# Patient Record
Sex: FEMALE | Race: ASIAN | NOT HISPANIC OR LATINO | Employment: OTHER | URBAN - METROPOLITAN AREA
[De-identification: names, ages, dates, MRNs, and addresses within clinical notes are randomized per-mention and may not be internally consistent; named-entity substitution may affect disease eponyms.]

---

## 2017-04-07 ENCOUNTER — OFFICE VISIT (OUTPATIENT)
Dept: MEDICAL GROUP | Facility: PHYSICIAN GROUP | Age: 62
End: 2017-04-07
Payer: COMMERCIAL

## 2017-04-07 ENCOUNTER — HOSPITAL ENCOUNTER (OUTPATIENT)
Facility: MEDICAL CENTER | Age: 62
End: 2017-04-07
Attending: NURSE PRACTITIONER
Payer: COMMERCIAL

## 2017-04-07 VITALS
DIASTOLIC BLOOD PRESSURE: 94 MMHG | TEMPERATURE: 97 F | HEART RATE: 74 BPM | SYSTOLIC BLOOD PRESSURE: 136 MMHG | OXYGEN SATURATION: 94 % | HEIGHT: 57 IN | WEIGHT: 176 LBS | BODY MASS INDEX: 37.97 KG/M2 | RESPIRATION RATE: 14 BRPM

## 2017-04-07 DIAGNOSIS — Z12.39 SCREENING FOR BREAST CANCER: ICD-10-CM

## 2017-04-07 DIAGNOSIS — L40.50 PSORIATIC ARTHRITIS (HCC): ICD-10-CM

## 2017-04-07 DIAGNOSIS — Z01.419 WELL WOMAN EXAM WITH ROUTINE GYNECOLOGICAL EXAM: ICD-10-CM

## 2017-04-07 DIAGNOSIS — Z12.4 SCREENING FOR CERVICAL CANCER: ICD-10-CM

## 2017-04-07 LAB — CYTOLOGY REG CYTOL: NORMAL

## 2017-04-07 PROCEDURE — 88175 CYTOPATH C/V AUTO FLUID REDO: CPT

## 2017-04-07 PROCEDURE — 99396 PREV VISIT EST AGE 40-64: CPT | Performed by: NURSE PRACTITIONER

## 2017-04-07 NOTE — MR AVS SNAPSHOT
"Kristie Farley   2017 8:20 AM   Office Visit   MRN: [id number]    Department:  [facility]   Dept Phone:  [phone]    Description:  Female : 1955   Provider:  ELMIRA Haywood           Reason for Visit     Gynecologic Exam           Allergies as of 2017     No Known Allergies      You were diagnosed with     Psoriatic arthritis (CMS-HCC)   [131994]       Screening for cervical cancer   [224682]       Well woman exam with routine gynecological exam   [803893]       Screening for breast cancer   [224592]         Vital Signs     Blood Pressure Pulse Temperature Respirations Height Weight    136/94 mmHg 74 36.1 °C (97 °F) 14 1.448 m (4' 9\") 79.833 kg (176 lb)    Body Mass Index Oxygen Saturation Smoking Status             38.08 kg/m2 94% Current Some Day Smoker         Basic Information     Date Of Birth Sex Race Ethnicity Preferred Language    1955 Female  Non- English      Your appointments     Oct 13, 2017  8:00 AM   Established Patient with ELMIRA Haywood   [facility] ([place])    [address] [place] [id number] [phone]           You will be receiving a confirmation call a few days before your appointment from our automated call confirmation system.              Problem List              ICD-10-CM Priority Class Noted - Resolved    Psoriatic arthritis (CMS-HCC) L40.50   Unknown - Present    Psoriasis L40.9   Unknown - Present    Colon polyp K63.5   2013 - Present    Osteopenia M85.80   2013 - Present    Hypertriglyceridemia E78.1   2013 - Present    DM (diabetes mellitus) (CMS-[facility]) E11.9   2013 - Present    DM (diabetes mellitus), type 2, uncontrolled (CMS-HCC) E11.65   10/20/2015 - Present    Essential hypertension I10   2016 - Present    Well woman exam with routine gynecological exam Z01.419   2017 - Present      Health Maintenance        Date Due Completion Dates    PAP SMEAR 2016 "    A1C SCREENING 12/6/2016 6/6/2016, 1/18/2016, 4/16/2015, 12/24/2014, 5/16/2014, 1/17/2014, 4/29/2013    RETINAL SCREENING 3/4/2017 3/4/2016, 8/7/2014, 7/1/2013    MAMMOGRAM 10/20/2017 (Originally 9/21/2016) 9/21/2015, 5/29/2014, 5/15/2013    COLONOSCOPY 10/26/2018 (Originally 8/30/2016) 8/30/2013 (Done)    Override on 8/30/2013: Done    FASTING LIPID PROFILE 6/6/2017 6/6/2016, 1/18/2016, 5/4/2015, 5/16/2014, 1/17/2014, 4/26/2013    SERUM CREATININE 6/6/2017 6/6/2016, 2/29/2016, 1/18/2016, 5/4/2015, 3/6/2015, 12/24/2014, 5/16/2014, 1/17/2014, 4/26/2013    DIABETES MONOFILAMENT / LE EXAM 10/7/2017 10/7/2016, 4/16/2015 (Done), 1/16/2014, 1/16/2014 (Done)    Override on 4/16/2015: Done    Override on 1/16/2014: Done    URINE ACR / MICROALBUMIN 10/17/2017 10/17/2016, 5/4/2015, 5/16/2014, 5/9/2013    IMM DTaP/Tdap/Td Vaccine (2 - Td) 6/25/2024 6/25/2014            Current Immunizations     Influenza TIV (IM) 11/3/2013    Influenza Vaccine Quad Inj (Pf) 10/10/2014    Influenza Vaccine Quad Inj (Preserved) 10/7/2016, 10/20/2015    Pneumococcal polysaccharide vaccine (PPSV-23) 1/16/2014    SHINGLES VACCINE 10/20/2015    Tdap Vaccine 6/25/2014      Below and/or attached are the medications your provider expects you to take. Review all of your home medications and newly ordered medications with your provider and/or pharmacist. Follow medication instructions as directed by your provider and/or pharmacist. Please keep your medication list with you and share with your provider. Update the information when medications are discontinued, doses are changed, or new medications (including over-the-counter products) are added; and carry medication information at all times in the event of emergency situations     Allergies:  No Known Allergies          Medications  Valid as of: April 07, 2017 -  8:39 AM    Generic Name Brand Name Tablet Size Instructions for use    Atorvastatin Calcium (Tab) LIPITOR 10 MG Take 1 Tab by mouth every day.         Blood Glucose Monitoring Suppl (Kit) ACCU-CHEK ADVANTAGE DIABETES  Test blood sugar every morning.        Calcipotriene (Solution) Calcipotriene 0.005 % USE SPARINGLY ON SCALP TWICE A DAY FOR 2 WEEKS WHEN NEEDED.        Glucose Blood (Strip) ACCU-CHEK SMARTVIEW  USE TO TEST BLOOD SUGAR EVERY MORNING        Lancet Devices (Misc) Lancet Device  Test blood sugar every morning.        Lisinopril (Tab) PRINIVIL 10 MG Take 1 Tab by mouth every day.        MetFORMIN HCl (Tab) GLUCOPHAGE 1000 MG TAKE ONE TABLET BY MOUTH ONE TIME DAILY        Naproxen (Tab) NAPROSYN 500 MG TAKE ONE TABLET BY MOUTH TWICE DAILY AS NEEDED        Scopolamine Base (PATCH 72 HR) TRANSDERM-SCOP 1.5 MG/3DAYS Apply 1 Patch to skin as directed every 72 hours.        .                 Medicines prescribed today were sent to:     [facility] - [place], [place] - [address]    [address] [place] [address]    Phone: [phone] Fax: [phone]    Open 24 Hours?: No      Medication refill instructions:       If your prescription bottle indicates you have medication refills left, it is not necessary to call your provider’s office. Please contact your pharmacy and they will refill your medication.    If your prescription bottle indicates you do not have any refills left, you may request refills at any time through one of the following ways: The online [employer] system (except Urgent Care), by calling your provider’s office, or by asking your pharmacy to contact your provider’s office with a refill request. Medication refills are processed only during regular business hours and may not be available until the next business day. Your provider may request additional information or to have a follow-up visit with you prior to refilling your medication.   *Please Note: Medication refills are assigned a new Rx number when refilled electronically. Your pharmacy may indicate that no refills were authorized even though a new prescription for the same  medication is available at the pharmacy. Please request the medicine by name with the pharmacy before contacting your provider for a refill.        Your To Do List     Future Labs/Procedures Complete By Expires    MA-SCREEN MAMMO W/CAD-BILAT  As directed 5/9/2018    THINPREP PAP,REFLEX HPV ON ASC-US ONLY  As directed 4/8/2018         [employer] Access Code: [email or web address]  Expires: 5/7/2017  8:39 AM    [employer]  A secure, online tool to manage your health information     [employer]® is a secure, online tool that connects you to your personalized health information from the privacy of your home -- day or night - making it very easy for you to manage your healthcare. Once the activation process is completed, you can even access your medical information using the [employer] jalen, which is available for free in the Apple Jalen store or Google Play store.     [employer] provides the following levels of access (as shown below):   My Chart Features   [facility] Doctor [facility]  Specialists [facility]  Urgent  Care Non-[facility]  Primary Care  Doctor   Email your healthcare team securely and privately 24/7 X X X    Manage appointments: schedule your next appointment; view details of past/upcoming appointments X      Request prescription refills. X      View recent personal medical records, including lab and immunizations X X X X   View health record, including health history, allergies, medications X X X X   Read reports about your outpatient visits, procedures, consult and ER notes X X X X   See your discharge summary, which is a recap of your hospital and/or ER visit that includes your diagnosis, lab results, and care plan. X X       How to register for [employer]:  1. Go to  [email or web address].  2. Click on the Sign Up Now box, which takes you to the New Member Sign Up page. You will need to provide the following information:  a. Enter your [employer] Access Code exactly as it appears at the top of this page. (You  will not need to use this code after you’ve completed the sign-up process. If you do not sign up before the expiration date, you must request a new code.)   b. Enter your date of birth.   c. Enter your home email address.   d. Click Submit, and follow the next screen’s instructions.  3. Create a [employer] ID. This will be your [employer] login ID and cannot be changed, so think of one that is secure and easy to remember.  4. Create a [employer] password. You can change your password at any time.  5. Enter your Password Reset Question and Answer. This can be used at a later time if you forget your password.   6. Enter your e-mail address. This allows you to receive e-mail notifications when new information is available in [employer].  7. Click Sign Up. You can now view your health information.    For assistance activating your [employer] account, call [phone]        Quit Tobacco Information     Do you want to quit using tobacco?    Quitting tobacco decreases risks of cancer, heart and lung disease, increases life expectancy, improves sense of taste and smell, and increases spending money, among other benefits.    If you are thinking about quitting, we can help.  • [facility] Quit Tobacco Program: [phone]  o Program occurs weekly for four weeks and includes pharmacist consultation on products to support quitting smoking or chewing tobacco. A provider referral is needed for pharmacist consultation.  • Tobacco Users Help Hotline: 6-216-QUIT-NOW (502-8276) or https://[place].quitlogix.org/  o Free, confidential telephone and online coaching for [place] residents. Sessions are designed on a schedule that is convenient for you. Eligible clients receive free nicotine replacement therapy.  • Nationally: www.smokefree.gov  o Information and professional assistance to support both immediate and long-term needs as you become, and remain, a non-smoker. Smokefree.gov allows you to choose the help that best fits your needs.

## 2017-04-07 NOTE — PROGRESS NOTES
CC:  Pap/Well Woman Exam    History of present illness:  Kristie Farley is 61 y.o. [place] female presenting today for well woman exam with gynecological exam and Pap smear.   Menopausal. Diet trying to follow diabetic. Exercise walks 3-4 times weekly.    Psoriatic arthritis  Seeing Dr. Calvillo.  Taking Humira every other week.    Well woman exam with routine gynecological exam  Patient here for well woman exam today. Denies any breast changes, lumps, discharge, dimpling. Denies any vaginal discharge, odor, bleeding, lesions. Patient does perform monthly breast exam.      Past Medical History   Diagnosis Date   • Psoriatic arthritis (CMS-HCC)    • Psoriasis    • Osteopenia    • Hyperplastic colon polyp 8/30/13   • Hypertriglyceridemia 5/9/2013   • Hypertension    • Diabetes (CMS-HCC)        Past Surgical History   Procedure Laterality Date   • Tonsillectomy     • Cataract extraction with iol     • Colonoscopy  08/30/13       Outpatient Encounter Prescriptions as of 4/7/2017   Medication Sig Dispense Refill   • lisinopril (PRINIVIL) 10 MG Tab Take 1 Tab by mouth every day. 90 Tab 3   • atorvastatin (LIPITOR) 10 MG Tab Take 1 Tab by mouth every day. 90 Tab 3   • metformin (GLUCOPHAGE) 1000 MG tablet TAKE ONE TABLET BY MOUTH ONE TIME DAILY 90 Tab 2   • naproxen (NAPROSYN) 500 MG Tab TAKE ONE TABLET BY MOUTH TWICE DAILY AS NEEDED 60 Tab 0   • ACCU-CHEK SMARTVIEW strip USE TO TEST BLOOD SUGAR EVERY MORNING 50 Strip 6   • Calcipotriene 0.005 % Solution USE SPARINGLY ON SCALP TWICE A DAY FOR 2 WEEKS WHEN NEEDED. 60 mL 2   • Blood Glucose Monitoring Suppl (ACCU-CHEK ADVANTAGE DIABETES) KIT Test blood sugar every morning. 1 Kit 0   • scopolamine (TRANSDERM-SCOPE) 1.5 MG PT72 Apply 1 Patch to skin as directed every 72 hours. 4 Patch 0   • Lancet Device MISC Test blood sugar every morning. 100 Each 4     No facility-administered encounter medications on file as of 4/7/2017.       Patient Active Problem List    Diagnosis Date Noted  "  • Well woman exam with routine gynecological exam 04/07/2017   • Essential hypertension 08/08/2016   • DM (diabetes mellitus), type 2, uncontrolled (CMS-HCC) 10/20/2015   • DM (diabetes mellitus) (CMS-HCC) 11/29/2013   • Hypertriglyceridemia 05/09/2013   • Colon polyp 04/24/2013   • Osteopenia 04/24/2013   • Psoriatic arthritis (CMS-HCC)    • Psoriasis        .  Social History     Social History   • Marital Status:      Spouse Name: N/A   • Number of Children: 2   • Years of Education: N/A     Occupational History   •  Other     Social History Main Topics   • Smoking status: Current Some Day Smoker -- 0.25 packs/day     Types: Cigarettes   • Smokeless tobacco: Never Used      Comment: 1 cigarette every few days with coffee   • Alcohol Use: No      Comment: special occasions   • Drug Use: No   • Sexual Activity:     Partners: Male     Other Topics Concern   • Not on file     Social History Narrative       History reviewed. No pertinent family history.      ROS:  Denies Weight loss, fatigue, chest pain, SOB, bowel or bladder changes. No significant dysmenorrhea, concerning vaginal discharge or irritation, no dyspareunia or postcoital bleeding. Denies h/o migraine with aura. Denies musculoskeletal, neurological, or psychiatric problems.      /94 mmHg  Pulse 74  Temp(Src) 36.1 °C (97 °F)  Resp 14  Ht 1.448 m (4' 9\")  Wt 79.833 kg (176 lb)  BMI 38.08 kg/m2  SpO2 94%    GEN:  Appears well and in no apparent distress   NECK:  Supple without adenopathy or thyromegaly  LUNGS:  Clear and equal. No wheeze, ronchi, or rales.  CV:  RRR, S1, S2. No murmur.  Pedal pulses 2+ bilaterally.  BREAST:  Symmetrical without masses. No nipple discharge.  ABD:  Soft, non-tender, non-distended, normal bowel sounds.  No hepatosplenomegaly.  :  Normal external female genitalia.  Vaginal canal clear.  Cervix appears normal. Specimen collected from transformation zone. Bimanual exam:  No CMT, normal size uterus without " masses or tenderness; no adnexal masses or tenderness.      Assessment and plan    1. Psoriatic arthritis (CMS-HCC)  Chronic. Stable. Has follow-up appointment with Dr. Caballero this summer.    2. Screening for cervical cancer  Well woman exam. Pap obtained. We'll monitor results.  - THINPREP PAP,REFLEX HPV ON ASC-US ONLY; Future    3. Well woman exam with routine gynecological exam  No abnormal findings upon exam.    4. Screening for breast cancer  Patient due for mammogram screening. Order placed. We'll monitor results.  - MA-SCREEN MAMMO W/CAD-BILAT; Future      F/u pending results

## 2017-04-07 NOTE — ASSESSMENT & PLAN NOTE
Patient here for well woman exam today. Denies any breast changes, lumps, discharge, dimpling. Denies any vaginal discharge, odor, bleeding, lesions. Patient does perform monthly breast exam.

## 2017-04-10 ENCOUNTER — TELEPHONE (OUTPATIENT)
Dept: MEDICAL GROUP | Facility: PHYSICIAN GROUP | Age: 62
End: 2017-04-10

## 2017-04-10 NOTE — TELEPHONE ENCOUNTER
----- Message from ELMIRA Haywood sent at 4/10/2017  7:14 AM PDT -----  Please notify patient that her PAP results are back and there is no evidence of infection or malignancy.   ELMIRA Haywood

## 2017-08-28 DIAGNOSIS — E11.8 TYPE 2 DIABETES MELLITUS WITH COMPLICATION, UNSPECIFIED LONG TERM INSULIN USE STATUS: ICD-10-CM

## 2017-08-28 NOTE — TELEPHONE ENCOUNTER
Requested Prescriptions     Signed Prescriptions Disp Refills   • metformin (GLUCOPHAGE) 1000 MG tablet 90 Tab 2     Sig: TAKE ONE TABLET BY MOUTH ONE TIME DAILY     Authorizing Provider: AYDIN TOBIN A.P.R.N.

## 2017-10-12 ENCOUNTER — OFFICE VISIT (OUTPATIENT)
Dept: MEDICAL GROUP | Facility: PHYSICIAN GROUP | Age: 62
End: 2017-10-12
Payer: COMMERCIAL

## 2017-10-12 VITALS
HEIGHT: 57 IN | DIASTOLIC BLOOD PRESSURE: 78 MMHG | WEIGHT: 170 LBS | SYSTOLIC BLOOD PRESSURE: 132 MMHG | TEMPERATURE: 96.8 F | OXYGEN SATURATION: 98 % | HEART RATE: 66 BPM | BODY MASS INDEX: 36.68 KG/M2

## 2017-10-12 DIAGNOSIS — I10 ESSENTIAL HYPERTENSION: ICD-10-CM

## 2017-10-12 DIAGNOSIS — Z23 FLU VACCINE NEED: ICD-10-CM

## 2017-10-12 DIAGNOSIS — E11.9 TYPE 2 DIABETES MELLITUS WITHOUT COMPLICATION, WITHOUT LONG-TERM CURRENT USE OF INSULIN (HCC): ICD-10-CM

## 2017-10-12 DIAGNOSIS — L40.50 PSORIATIC ARTHRITIS (HCC): ICD-10-CM

## 2017-10-12 PROCEDURE — 90686 IIV4 VACC NO PRSV 0.5 ML IM: CPT | Performed by: NURSE PRACTITIONER

## 2017-10-12 PROCEDURE — 90471 IMMUNIZATION ADMIN: CPT | Performed by: NURSE PRACTITIONER

## 2017-10-12 PROCEDURE — 99214 OFFICE O/P EST MOD 30 MIN: CPT | Mod: 25 | Performed by: NURSE PRACTITIONER

## 2017-10-12 ASSESSMENT — PAIN SCALES - GENERAL: PAINLEVEL: NO PAIN

## 2017-10-12 NOTE — ASSESSMENT & PLAN NOTE
"Daily blood sugars running 120\"s.  Taking metformin 1000 mg nightly.  Walking daily with [employer].  No c/o numbness/tingling.  States she is feeling  "

## 2017-10-12 NOTE — PROGRESS NOTES
"Chief Complaint   Patient presents with   • Follow-Up     6 month       Subjective:   Kristie Farley is a 62 y.o.[place] female here today for evaluation and management of:    Psoriatic arthritis  Seeing Dr. Calvillo once a year.  Still taking Enbrel q 2 weeks.     Essential hypertension  bp at goal today  132/78  No chest pain, sob, visual disturbances or ankle swelling.     DM (diabetes mellitus), type 2, uncontrolled  Daily blood sugars running 120\"s.  Taking metformin 1000 mg nightly.  Walking daily with grandbaby.  No c/o numbness/tingling.  States she is feeling         Current medicines (including changes today)  Current Outpatient Prescriptions   Medication Sig Dispense Refill   • metformin (GLUCOPHAGE) 1000 MG tablet TAKE ONE TABLET BY MOUTH ONE TIME DAILY 90 Tab 2   • lisinopril (PRINIVIL) 10 MG Tab Take 1 Tab by mouth every day. 90 Tab 3   • naproxen (NAPROSYN) 500 MG Tab TAKE ONE TABLET BY MOUTH TWICE DAILY AS NEEDED 60 Tab 0   • ACCU-CHEK SMARTVIEW strip USE TO TEST BLOOD SUGAR EVERY MORNING 50 Strip 6   • atorvastatin (LIPITOR) 10 MG Tab Take 1 Tab by mouth every day. 90 Tab 3   • Calcipotriene 0.005 % Solution USE SPARINGLY ON SCALP TWICE A DAY FOR 2 WEEKS WHEN NEEDED. 60 mL 2   • Blood Glucose Monitoring Suppl (ACCU-CHEK ADVANTAGE DIABETES) KIT Test blood sugar every morning. 1 Kit 0   • scopolamine (TRANSDERM-SCOPE) 1.5 MG PT72 Apply 1 Patch to skin as directed every 72 hours. 4 Patch 0   • Lancet Device MISC Test blood sugar every morning. 100 Each 4     No current facility-administered medications for this visit.      She  has a past medical history of Diabetes (CMS-HCC); Hyperplastic colon polyp (8/30/13); Hypertension; Hypertriglyceridemia (5/9/2013); Osteopenia; Psoriasis; and Psoriatic arthritis (CMS-HCC). She also has no past medical history of Cancer (CMS-HCC).    Luis stated in history of present illness.  No chest pain, no shortness of breath, no abdominal pain       Objective:     Blood " "pressure 132/78, pulse 66, temperature 36 °C (96.8 °F), height 1.448 m (4' 9\"), weight 77.1 kg (170 lb), SpO2 98 %. Body mass index is 36.79 kg/m². Weight is down 6 pounds since her last visit. She is walking daily.  Physical Exam:  Constitutional: Alert, no distress.  Skin: Warm, dry, good turgor,no cyanosis, no rashes in visible areas.  Eye: Equal, round and reactive, conjunctiva clear, lids normal.  Ears: No tenderness, no discharge.  External canals are without any significant edema or erythema.  Gross auditory acuity is intact.  Nose: symmetrical without tenderness, no discharge.  Mouth/Throat: lips without lesion.  Oropharynx clear  Neck: Trachea midline, no masses, no obvious thyroid enlargement.. . Range of motion within normal limits.  Neuro: Cranial nerves 2-12 grossly intact.  No sensory deficit.  Respiratory: Unlabored respiratory effort, lungs clear to auscultation, no wheezes, no ronchi.  Cardiovascular: Normal S1, S2, no murmur, no edema.  Monofilament testing with a 10 gram force: sensation intact: intact bilaterally  Visual Inspection: Feet without maceration, ulcers, fissures.  Pedal pulses: intact bilaterally    Psych: Alert and oriented x3, normal affect and mood and judgement.        Assessment and Plan:   The following treatment plan was discussed    1. Flu vaccine need  Patient requesting flu vaccine today. No acute illness. Consent obtained.  I have placed the below orders and discussed them with an approved delegating provider.  The MA is performing the below orders under the direction of Tato Roth M.D.    - INFLUENZA VACCINE QUAD INJ >3Y(PF)    2. Psoriatic arthritis (CMS-HCC)  Chronic issue. Stable. Being followed by Dr. Calvillo rheumatology. Enbrel q 2 weeks    3. Ess ential hypertension  Chronic issue. Stable. Continue lisinopril daily. Blood pressure goal today. Continue good exercise program.    4. Type 2 diabetes mellitus without complication, without long-term current use of insulin " (CMS-HCC)  Chronic. Ongoing. Patient is due for lab work. Orders provided. We'll monitor. Continue to monitor daily blood sugars. Continue to watch diet and exercise daily as she is currently. Weight loss down 6 pounds. Monitor and follow. Patient to return in 6 months.  - COMP METABOLIC PANEL; Future  - HEMOGLOBIN A1C; Future  - LIPID PROFILE; Future  - Diabetic Monofilament LE Exam    5. Uncontrolled type 2 diabetes mellitus without complication, without long-term current use of insulin (CMS-HCC)  See problem #4.      Followup: Return in about 6 months (around 4/12/2018) for Diabetes.

## 2017-10-28 ENCOUNTER — HOSPITAL ENCOUNTER (OUTPATIENT)
Dept: LAB | Facility: MEDICAL CENTER | Age: 62
End: 2017-10-28
Attending: NURSE PRACTITIONER
Payer: COMMERCIAL

## 2017-10-28 DIAGNOSIS — E11.9 TYPE 2 DIABETES MELLITUS WITHOUT COMPLICATION, WITHOUT LONG-TERM CURRENT USE OF INSULIN (HCC): ICD-10-CM

## 2017-10-28 LAB
ALBUMIN SERPL BCP-MCNC: 4.3 G/DL (ref 3.2–4.9)
ALBUMIN/GLOB SERPL: 1.4 G/DL
ALP SERPL-CCNC: 55 U/L (ref 30–99)
ALT SERPL-CCNC: 5 U/L (ref 2–50)
ANION GAP SERPL CALC-SCNC: 8 MMOL/L (ref 0–11.9)
AST SERPL-CCNC: 12 U/L (ref 12–45)
BILIRUB SERPL-MCNC: 0.8 MG/DL (ref 0.1–1.5)
BUN SERPL-MCNC: 15 MG/DL (ref 8–22)
CALCIUM SERPL-MCNC: 9.4 MG/DL (ref 8.5–10.5)
CHLORIDE SERPL-SCNC: 105 MMOL/L (ref 96–112)
CHOLEST SERPL-MCNC: 136 MG/DL (ref 100–199)
CO2 SERPL-SCNC: 26 MMOL/L (ref 20–33)
CREAT SERPL-MCNC: 0.64 MG/DL (ref 0.5–1.4)
EST. AVERAGE GLUCOSE BLD GHB EST-MCNC: 177 MG/DL
GFR SERPL CREATININE-BSD FRML MDRD: >60 ML/MIN/1.73 M 2
GLOBULIN SER CALC-MCNC: 3.1 G/DL (ref 1.9–3.5)
GLUCOSE SERPL-MCNC: 154 MG/DL (ref 65–99)
HBA1C MFR BLD: 7.8 % (ref 0–5.6)
HDLC SERPL-MCNC: 49 MG/DL
LDLC SERPL CALC-MCNC: 57 MG/DL
POTASSIUM SERPL-SCNC: 4 MMOL/L (ref 3.6–5.5)
PROT SERPL-MCNC: 7.4 G/DL (ref 6–8.2)
SODIUM SERPL-SCNC: 139 MMOL/L (ref 135–145)
TRIGL SERPL-MCNC: 152 MG/DL (ref 0–149)

## 2017-10-28 PROCEDURE — 83036 HEMOGLOBIN GLYCOSYLATED A1C: CPT

## 2017-10-28 PROCEDURE — 80061 LIPID PANEL: CPT

## 2017-10-28 PROCEDURE — 80053 COMPREHEN METABOLIC PANEL: CPT

## 2017-10-28 PROCEDURE — 36415 COLL VENOUS BLD VENIPUNCTURE: CPT

## 2017-11-01 ENCOUNTER — TELEPHONE (OUTPATIENT)
Dept: MEDICAL GROUP | Facility: PHYSICIAN GROUP | Age: 62
End: 2017-11-01

## 2017-11-01 NOTE — LETTER
November 6, 2017         Kristie Farley  [address] [place] [address]        Dear Kristie:  My calls to you have gone unanswered please see Providers note below. Please call our office at [phone] if you have any questions.     Below are the results from your recent visit:  Please advise patient to make an appointment to discuss her lab results.There has been worsening of the A1C (6.6 --> 7.8) that we check for diabetes and she may need to review her diabetes management with Rosalia.     Henna Hennessy M.D.  Covering for Rosalia Garcia    Resulted Orders   COMP METABOLIC PANEL   Result Value Ref Range    Sodium 139 135 - 145 mmol/L    Potassium 4.0 3.6 - 5.5 mmol/L    Chloride 105 96 - 112 mmol/L    Co2 26 20 - 33 mmol/L    Anion Gap 8.0 0.0 - 11.9    Glucose 154 (H) 65 - 99 mg/dL    Bun 15 8 - 22 mg/dL    Creatinine 0.64 0.50 - 1.40 mg/dL    Calcium 9.4 8.5 - 10.5 mg/dL    AST(SGOT) 12 12 - 45 U/L    ALT(SGPT) 5 2 - 50 U/L    Alkaline Phosphatase 55 30 - 99 U/L    Total Bilirubin 0.8 0.1 - 1.5 mg/dL    Albumin 4.3 3.2 - 4.9 g/dL    Total Protein 7.4 6.0 - 8.2 g/dL    Globulin 3.1 1.9 - 3.5 g/dL    A-G Ratio 1.4 g/dL    Narrative    Request patient fasting?->Yes   HEMOGLOBIN A1C   Result Value Ref Range    Glycohemoglobin 7.8 (H) 0.0 - 5.6 %      Comment:      Increased risk for diabetes:  5.7 -6.4%  Diabetes:  >6.4%  Glycemic control for adults with diabetes:  <7.0%  The above interpretations are per ADA guidelines.  Diagnosis  of diabetes mellitus on the basis of elevated Hemoglobin A1c  should be confirmed by repeating the Hb A1c test.      Est Avg Glucose 177 mg/dL      Comment:      The eAG calculation is based on the A1c-Derived Daily Glucose  (ADAG) study.  See the ADA's website for additional information.      Narrative    Request patient fasting?->Yes   LIPID PROFILE   Result Value Ref Range    Cholesterol,Tot 136 100 - 199 mg/dL    Triglycerides 152 (H) 0 - 149 mg/dL    HDL 49 >=40 mg/dL    LDL 57 <100  mg/dL    Narrative    Request patient fasting?->Yes   ESTIMATED GFR   Result Value Ref Range    GFR If African American >60 >60 mL/min/1.73 m 2    GFR If Non African American >60 >60 mL/min/1.73 m 2    Narrative    Request patient fasting?->Yes       Electronically Signed

## 2017-11-01 NOTE — TELEPHONE ENCOUNTER
----- Message from Henna Hennessy M.D. sent at 10/31/2017  1:41 PM PDT -----  Please advise patient to make an appointment to discuss her lab results.There has been worsening of the A1C (6.6 --> 7.8) that we check for diabetes and she may need to review her diabetes management with Rosalia.    Henna Hennessy M.D.  Covering for Rosalia Garcia

## 2017-11-03 ENCOUNTER — OFFICE VISIT (OUTPATIENT)
Dept: MEDICAL GROUP | Facility: PHYSICIAN GROUP | Age: 62
End: 2017-11-03
Payer: COMMERCIAL

## 2017-11-03 VITALS
HEART RATE: 86 BPM | WEIGHT: 168 LBS | RESPIRATION RATE: 14 BRPM | TEMPERATURE: 97.3 F | BODY MASS INDEX: 36.24 KG/M2 | HEIGHT: 57 IN | DIASTOLIC BLOOD PRESSURE: 80 MMHG | OXYGEN SATURATION: 100 % | SYSTOLIC BLOOD PRESSURE: 110 MMHG

## 2017-11-03 DIAGNOSIS — E78.1 HYPERTRIGLYCERIDEMIA: ICD-10-CM

## 2017-11-03 DIAGNOSIS — E66.9 OBESITY (BMI 35.0-39.9 WITHOUT COMORBIDITY): ICD-10-CM

## 2017-11-03 DIAGNOSIS — I10 ESSENTIAL HYPERTENSION: ICD-10-CM

## 2017-11-03 DIAGNOSIS — E11.9 TYPE 2 DIABETES MELLITUS WITHOUT COMPLICATION, WITHOUT LONG-TERM CURRENT USE OF INSULIN (HCC): ICD-10-CM

## 2017-11-03 PROCEDURE — 99214 OFFICE O/P EST MOD 30 MIN: CPT | Performed by: NURSE PRACTITIONER

## 2017-11-03 RX ORDER — ATORVASTATIN CALCIUM 10 MG/1
10 TABLET, FILM COATED ORAL DAILY
Qty: 90 TAB | Refills: 3 | Status: SHIPPED | OUTPATIENT
Start: 2017-11-03 | End: 2018-11-13 | Stop reason: SDUPTHER

## 2017-11-03 RX ORDER — LISINOPRIL 10 MG/1
10 TABLET ORAL DAILY
Qty: 90 TAB | Refills: 3 | Status: SHIPPED | OUTPATIENT
Start: 2017-11-03 | End: 2018-10-19 | Stop reason: SDUPTHER

## 2017-11-03 ASSESSMENT — PATIENT HEALTH QUESTIONNAIRE - PHQ9: CLINICAL INTERPRETATION OF PHQ2 SCORE: 0

## 2017-11-03 NOTE — ASSESSMENT & PLAN NOTE
Last A1c 7.6  patient reports she is 100% compliance with thousand milligrams of metformin daily. She is walking most days. She denies any chest pain shortness of breath numbness or tingling in her hands or feet. No visual disturbances reported.

## 2017-11-03 NOTE — PROGRESS NOTES
Chief Complaint   Patient presents with   • Follow-Up     lab results       Subjective:   Kristie Farley is a 62 y.o.[place] female here today for evaluation and management of:    DM (diabetes mellitus), type 2, uncontrolled  Last A1c 7.6  patient reports she is 100% compliance with thousand milligrams of metformin daily. She is walking most days. She denies any chest pain shortness of breath numbness or tingling in her hands or feet. No visual disturbances reported.    Obesity (BMI 35.0-39.9 without comorbidity) (Formerly [facility])  Patient's BMI today 36.35. This is down from 38.09 in April of this year. She has been walking fairly consistently. She has cut out sweets.         Current medicines (including changes today)  Current Outpatient Prescriptions   Medication Sig Dispense Refill   • lisinopril (PRINIVIL) 10 MG Tab Take 1 Tab by mouth every day. 90 Tab 3   • atorvastatin (LIPITOR) 10 MG Tab Take 1 Tab by mouth every day. 90 Tab 3   • metformin (GLUCOPHAGE) 1000 MG tablet TAKE ONE TABLET BY MOUTH ONE TIME DAILY 90 Tab 2   • naproxen (NAPROSYN) 500 MG Tab TAKE ONE TABLET BY MOUTH TWICE DAILY AS NEEDED 60 Tab 0   • ACCU-CHEK SMARTVIEW strip USE TO TEST BLOOD SUGAR EVERY MORNING 50 Strip 6   • Calcipotriene 0.005 % Solution USE SPARINGLY ON SCALP TWICE A DAY FOR 2 WEEKS WHEN NEEDED. 60 mL 2   • Blood Glucose Monitoring Suppl (ACCU-CHEK ADVANTAGE DIABETES) KIT Test blood sugar every morning. 1 Kit 0   • Lancet Device MISC Test blood sugar every morning. 100 Each 4   • scopolamine (TRANSDERM-SCOPE) 1.5 MG PT72 Apply 1 Patch to skin as directed every 72 hours. 4 Patch 0     No current facility-administered medications for this visit.      She  has a past medical history of Diabetes (CMS-HCC); Hyperplastic colon polyp (8/30/13); Hypertension; Hypertriglyceridemia (5/9/2013); Osteopenia; Psoriasis; and Psoriatic arthritis (CMS-HCC). She also has no past medical history of Cancer (CMS-HCC).    Luis stated in history of present  "illness  No chest pain, no shortness of breath, no abdominal pain       Objective:     Blood pressure 110/80, pulse 86, temperature 36.3 °C (97.3 °F), resp. rate 14, height 1.448 m (4' 9\"), weight 76.2 kg (168 lb), SpO2 100 %. Body mass index is 36.35 kg/m².   Physical Exam:  Constitutional: Alert, no distress.  Skin: Warm, dry, good turgor,no cyanosis, no rashes in visible areas.  Eye: Equal, round and reactive, conjunctiva clear, lids normal.  Ears: No tenderness, no discharge.  External canals are without any significant edema or erythema. .  Gross auditory acuity is intact.  Nose: symmetrical without tenderness, no discharge.  Mouth/Throat: lips without lesion.  Oropharynx clear.  .  Neck: Trachea midline, no masses, no obvious thyroid enlargement..Range of motion within normal limits.  Neuro: Cranial nerves 2-12 grossly intact.  No sensory deficit.  Respiratory: Unlabored respiratory effort, lungs clear to auscultation, no wheezes, no ronchi.  Cardiovascular: Normal S1, S2, no murmur, no edema.  Abdomen: Soft, obese  Non-tender, no masses, no guarding,  no hepatosplenomegaly.  Psych: Alert and oriented x3, normal affect and mood and judgement.        Assessment and Plan:   The following treatment plan was discussed    1. Type 2 diabetes mellitus without complication, without long-term current use of insulin (CMS-[facility])  Chronic. Ongoing. Unstable at this time. A1c has risen to 7.6. Increase metformin 2000 mg twice a day with meals. Increase exercise to 20 minutes at least 5 days a week. Weight loss goal 5 pounds in the next 3 months. Patient to return in 3 months to check A1c. If at this time the A1c has not improved we will consider adding an additional medication. Monitor and follow.  - MICROALBUMIN CREAT RATIO URINE; Future  - HEMOGLOBIN A1C; Future    2. Essential hypertension  Chronic. Stable. Blood pressure goal today. Continue lisinopril 10 mg tablets daily. Continue with exercise and diet.  - lisinopril " (PRINIVIL) 10 MG Tab; Take 1 Tab by mouth every day.  Dispense: 90 Tab; Refill: 3    3. Hypertriglyceridemia  Chronic. Stable. Last cholesterol and triglycerides have improved from a year ago. Continue with atorvastatin 10 mg by mouth daily. Monitor and follow.   - atorvastatin (LIPITOR) 10 MG Tab; Take 1 Tab by mouth every day.  Dispense: 90 Tab; Refill: 3    4. Uncontrolled type 2 diabetes mellitus without complication, without long-term current use of insulin (CMS-[facility])  See problem #1    5. Obesity (BMI 35.0-39.9 without comorbidity)  Chronic. Improving. Continue with exercise goals. Weight loss goal of 5 pounds in the next 3 months.  - Patient identified as having weight management issue.  Appropriate orders and counseling given.      Followup: Return in about 3 months (around 2/3/2018) for Diabetes.

## 2017-11-03 NOTE — ASSESSMENT & PLAN NOTE
Patient's BMI today 36.35. This is down from 38.09 in April of this year. She has been walking fairly consistently. She has cut out sweets.

## 2017-12-08 ENCOUNTER — HOSPITAL ENCOUNTER (OUTPATIENT)
Dept: RADIOLOGY | Facility: MEDICAL CENTER | Age: 62
End: 2017-12-08
Attending: NURSE PRACTITIONER
Payer: COMMERCIAL

## 2017-12-08 DIAGNOSIS — Z12.39 SCREENING FOR BREAST CANCER: ICD-10-CM

## 2017-12-08 PROCEDURE — G0202 SCR MAMMO BI INCL CAD: HCPCS

## 2017-12-11 ENCOUNTER — TELEPHONE (OUTPATIENT)
Dept: MEDICAL GROUP | Facility: PHYSICIAN GROUP | Age: 62
End: 2017-12-11

## 2017-12-11 NOTE — TELEPHONE ENCOUNTER
----- Message from ELMIRA Haywood sent at 12/11/2017  9:04 AM PST -----  Please notify patient that her mammogram results have been read.  There was no evidence of malignancy or suspicious areas of concern.   Recommend yearly follow up screening.

## 2017-12-11 NOTE — LETTER
December 11, 2017         Kristie Farley  [address] [place] [address]        Dear Kristie:      Below are the results from your recent visit:    Please notify patient that her mammogram results have been read.  There was no evidence of malignancy or suspicious areas of concern.   Recommend yearly follow up screening.     Resulted Orders   MA-SCREEN MAMMO W/CAD-BILAT    Narrative    12/8/2017 3:59 PM    HISTORY/REASON FOR EXAM:  Routine Mammographic Screening.      TECHNIQUE/EXAM DESCRIPTION:  Bilateral digital screening mammography was performed and interpreted with CAD.    COMPARISON:   September 21, 2015 and May 29, 2014    FINDINGS:    There are scattered areas of fibroglandular density.  There is no dominant mass, suspicious calcification, or any secondary malignant sign.      Impression    1.  Breasts have scattered areas of fibroglandular density, with no radiographic evidence of malignancy.    2.  Screening mammogram in one year is recommended.      R1 - Category 1:  Negative       If you have any questions or concerns, please don't hesitate to call.        Sincerely,      ELMIRA Haywood    Electronically Signed

## 2018-01-04 ENCOUNTER — TELEPHONE (OUTPATIENT)
Dept: MEDICAL GROUP | Facility: PHYSICIAN GROUP | Age: 63
End: 2018-01-04

## 2018-01-04 DIAGNOSIS — E11.8 TYPE 2 DIABETES MELLITUS WITH COMPLICATION, UNSPECIFIED LONG TERM INSULIN USE STATUS: ICD-10-CM

## 2018-01-04 NOTE — TELEPHONE ENCOUNTER
VOICEMAIL  1. Caller Name: Anahi(daughter)                      Call Back Number:[phone]      2. Message: Patient's daughter called and states she was told to take her metformin BID but the pharmacy never got an updated RX. Please advise and send in new RX if correct.     3. Patient approves office to leave a detailed voicemail/MyChart message: N\A

## 2018-01-19 ENCOUNTER — HOSPITAL ENCOUNTER (OUTPATIENT)
Dept: LAB | Facility: MEDICAL CENTER | Age: 63
End: 2018-01-19
Attending: NURSE PRACTITIONER
Payer: COMMERCIAL

## 2018-01-19 DIAGNOSIS — E11.9 TYPE 2 DIABETES MELLITUS WITHOUT COMPLICATION, WITHOUT LONG-TERM CURRENT USE OF INSULIN (HCC): ICD-10-CM

## 2018-01-19 LAB
CREAT UR-MCNC: 114.5 MG/DL
EST. AVERAGE GLUCOSE BLD GHB EST-MCNC: 128 MG/DL
HBA1C MFR BLD: 6.1 % (ref 0–5.6)
MICROALBUMIN UR-MCNC: 1.7 MG/DL
MICROALBUMIN/CREAT UR: 15 MG/G (ref 0–30)

## 2018-01-19 PROCEDURE — 82043 UR ALBUMIN QUANTITATIVE: CPT

## 2018-01-19 PROCEDURE — 36415 COLL VENOUS BLD VENIPUNCTURE: CPT

## 2018-01-19 PROCEDURE — 82570 ASSAY OF URINE CREATININE: CPT

## 2018-01-19 PROCEDURE — 83036 HEMOGLOBIN GLYCOSYLATED A1C: CPT

## 2018-01-22 ENCOUNTER — TELEPHONE (OUTPATIENT)
Dept: MEDICAL GROUP | Facility: PHYSICIAN GROUP | Age: 63
End: 2018-01-22

## 2018-01-23 NOTE — TELEPHONE ENCOUNTER
----- Message from ELMIRA Haywood sent at 1/22/2018  1:49 PM PST -----  Kristie  Labs are back.  Your A1c is great at 6.1.  All other labs in the normal range.  We will go over together at your upcoming appointment  ELMIRA Haywood

## 2018-02-05 ENCOUNTER — OFFICE VISIT (OUTPATIENT)
Dept: MEDICAL GROUP | Facility: PHYSICIAN GROUP | Age: 63
End: 2018-02-05
Payer: COMMERCIAL

## 2018-02-05 VITALS
BODY MASS INDEX: 36.46 KG/M2 | SYSTOLIC BLOOD PRESSURE: 112 MMHG | HEART RATE: 66 BPM | TEMPERATURE: 96.6 F | DIASTOLIC BLOOD PRESSURE: 70 MMHG | WEIGHT: 169 LBS | HEIGHT: 57 IN | OXYGEN SATURATION: 97 % | RESPIRATION RATE: 14 BRPM

## 2018-02-05 DIAGNOSIS — I10 ESSENTIAL HYPERTENSION: ICD-10-CM

## 2018-02-05 DIAGNOSIS — E78.1 HYPERTRIGLYCERIDEMIA: ICD-10-CM

## 2018-02-05 PROCEDURE — 99214 OFFICE O/P EST MOD 30 MIN: CPT | Performed by: NURSE PRACTITIONER

## 2018-02-06 NOTE — PROGRESS NOTES
"Chief Complaint   Patient presents with   • Follow-Up   • Diabetes       Subjective:   Kristie Farley is a 62 y.o. [place] female here today for evaluation and management of:    DM (diabetes mellitus), type 2, uncontrolled  A1c improved to 6.1.  Walking daily.  Watching sugars.  BS at homerunning  120's.  Feeling better.     Hypertriglyceridemia  lipitor daily.  Exercising daily    Essential hypertension  bp on goal 112/70.  Lisinopril daily.  Exercising daily         Current medicines (including changes today)  Current Outpatient Prescriptions   Medication Sig Dispense Refill   • glucose blood (ACCU-CHEK SMARTVIEW) strip 1 Strip by Other route every day. 50 Strip 6   • metformin (GLUCOPHAGE) 1000 MG tablet Take 1 Tab by mouth 2 times a day, with meals. 180 Tab 2   • lisinopril (PRINIVIL) 10 MG Tab Take 1 Tab by mouth every day. 90 Tab 3   • atorvastatin (LIPITOR) 10 MG Tab Take 1 Tab by mouth every day. 90 Tab 3   • Calcipotriene 0.005 % Solution USE SPARINGLY ON SCALP TWICE A DAY FOR 2 WEEKS WHEN NEEDED. 60 mL 2   • Blood Glucose Monitoring Suppl (ACCU-CHEK ADVANTAGE DIABETES) KIT Test blood sugar every morning. 1 Kit 0   • Lancet Device MISC Test blood sugar every morning. 100 Each 4     No current facility-administered medications for this visit.      She  has a past medical history of Diabetes (CMS-HCC); Hyperplastic colon polyp (8/30/13); Hypertension; Hypertriglyceridemia (5/9/2013); Osteopenia; Psoriasis; and Psoriatic arthritis (CMS-HCC). She also has no past medical history of Cancer (CMS-HCC).    ROS as stated in hpi  No chest pain, no shortness of breath, no abdominal pain       Objective:     Blood pressure 112/70, pulse 66, temperature 35.9 °C (96.6 °F), resp. rate 14, height 1.448 m (4' 9\"), weight 76.7 kg (169 lb), SpO2 97 %. Body mass index is 36.57 kg/m². bp at goal.   Physical Exam:  Constitutional: Alert, no distress.  Skin: Warm, dry, good turgor,no cyanosis, no rashes in visible areas.  Eye: " Equal, round and reactive, conjunctiva clear, lids normal.  Ears: No tenderness, no discharge.  External canals are without any significant edema or erythema.  Tympanic membranes are without any inflammation, no effusion.  Gross auditory acuity is intact.  Nose: symmetrical without tenderness, no discharge.  Mouth/Throat: lips without lesion.  Oropharynx clear.  Throat without erythema, exudates or tonsillar enlargement.  Neck: Trachea midline, no masses, no obvious thyroid enlargement.. No cervical or supraclavicular lymphadenopathy. Range of motion within normal limits.  Neuro: Cranial nerves 2-12 grossly intact.  No sensory deficit.  Respiratory: Unlabored respiratory effort, lungs clear to auscultation, no wheezes, no ronchi.  Cardiovascular: Normal S1, S2, no murmur, no edema.  Abdomen: Soft, non-tender, no masses, no guarding,  no hepatosplenomegaly.  Psych: Alert and oriented x3, normal affect and mood and judgement.        Assessment and Plan:   The following treatment plan was discussed  1. Uncontrolled type 2 diabetes mellitus without complication, without long-term current use of insulin (CMS-[facility])  Chronic.  Improved.  A1c 6.1.  Continue good exercise.  Measure rice 1/2 cup serving.  Metformin 100o mg.  RTC 6 months.     2. Hypertriglyceridemia  Chronic.  Ongoing. Stable.  Continue with lipitor.  Exercise continue.  Good job.     3. Essential hypertension  Chronic. Stable.  BP at goal. Lisinopril daily.  Continue good exercise, diet and weight loss.  There are no diagnoses linked to this encounter.    Followup: Return in about 6 months (around 8/5/2018) for Diabetes.

## 2018-02-06 NOTE — ASSESSMENT & PLAN NOTE
A1c improved to 6.1.  Walking daily.  Watching sugars.  BS at homerunning  120's.  Feeling better.

## 2018-08-06 ENCOUNTER — OFFICE VISIT (OUTPATIENT)
Dept: MEDICAL GROUP | Facility: PHYSICIAN GROUP | Age: 63
End: 2018-08-06
Payer: COMMERCIAL

## 2018-08-06 VITALS
HEART RATE: 62 BPM | DIASTOLIC BLOOD PRESSURE: 70 MMHG | TEMPERATURE: 98.1 F | SYSTOLIC BLOOD PRESSURE: 110 MMHG | HEIGHT: 57 IN | BODY MASS INDEX: 35.6 KG/M2 | OXYGEN SATURATION: 97 % | WEIGHT: 165 LBS

## 2018-08-06 DIAGNOSIS — I10 ESSENTIAL HYPERTENSION: ICD-10-CM

## 2018-08-06 DIAGNOSIS — E66.9 OBESITY (BMI 35.0-39.9 WITHOUT COMORBIDITY): ICD-10-CM

## 2018-08-06 PROCEDURE — 99214 OFFICE O/P EST MOD 30 MIN: CPT | Performed by: NURSE PRACTITIONER

## 2018-08-06 ASSESSMENT — PAIN SCALES - GENERAL: PAINLEVEL: NO PAIN

## 2018-08-07 NOTE — PROGRESS NOTES
"Chief Complaint   Patient presents with   • Diabetes       Subjective:   Kristie Farley is a 62 y.o. female here today for evaluation and management of:    DM (diabetes mellitus), type 2, uncontrolled  A1c 7.6 today.  Still walking with dogs daily.  Weight down 3 pounds. Taking metformin 1000 mg bid.  Reports that she is eating well.  Not eating sweets. Has cut back on rice portions    Essential hypertension  Bp at goal 110/70.  No chest pain, sob, visual disturbances or edema reported.  Walking daily.     Obesity (BMI 35.0-39.9 without comorbidity) (Formerly [facility])  BMI 35.71 today.  Walking daily.  Working on portion control on white carbs.            Current medicines (including changes today)  Current Outpatient Prescriptions   Medication Sig Dispense Refill   • SITagliptin (JANUVIA) 50 MG Tab Take 1 Tab by mouth every day. 30 Tab 3   • glucose blood (ACCU-CHEK SMARTVIEW) strip 1 Strip by Other route every day. 50 Strip 6   • metformin (GLUCOPHAGE) 1000 MG tablet Take 1 Tab by mouth 2 times a day, with meals. 180 Tab 2   • lisinopril (PRINIVIL) 10 MG Tab Take 1 Tab by mouth every day. 90 Tab 3   • atorvastatin (LIPITOR) 10 MG Tab Take 1 Tab by mouth every day. 90 Tab 3   • Blood Glucose Monitoring Suppl (ACCU-CHEK ADVANTAGE DIABETES) KIT Test blood sugar every morning. 1 Kit 0   • Lancet Device MISC Test blood sugar every morning. 100 Each 4     No current facility-administered medications for this visit.      She  has a past medical history of Diabetes ([facility]); Hyperplastic colon polyp (8/30/13); Hypertension; Hypertriglyceridemia (5/9/2013); Osteopenia; Psoriasis; and Psoriatic arthritis ([facility]). She also has no past medical history of Cancer ([facility]).    ROS as stated in hpi  No chest pain, no shortness of breath, no abdominal pain       Objective:     Blood pressure 110/70, pulse 62, temperature 36.7 °C (98.1 °F), height 1.448 m (4' 9\"), weight 74.8 kg (165 lb), SpO2 97 %. Body mass index is 35.71 kg/m². weight down 3 pounds from " January.  Physical Exam:  Constitutional: Alert, no distress.  Skin: Warm, dry, good turgor,no cyanosis, no rashes in visible areas.  Eye: Equal, round and reactive, conjunctiva clear, lids normal.  Ears: No tenderness, no discharge.  External canals are without any significant edema or erythema.  .  Gross auditory acuity is intact.  Nose: symmetrical without tenderness, no discharge.  Mouth/Throat: lips without lesion.  Oropharynx clear  Neck: Trachea midline, no masses, no obvious thyroid enlargement.. . Range of motion within normal limits.  Neuro: Cranial nerves 2-12 grossly intact.  No sensory deficit.  Respiratory: Unlabored respiratory effort, lungs clear to auscultation, no wheezes, no ronchi.  Cardiovascular: Normal S1, S2, no murmur, no edema.  Psych: Alert and oriented x3, normal affect and mood and judgement.        Assessment and Plan:   The following treatment plan was discussed    1. Uncontrolled type 2 diabetes mellitus without complication, without long-term current use of insulin ([facility])  Ongoing, chronic issue.  Will add januvia 50 mg daily.  A1c 7.6.  Continue lifestyle modifications.  Return in 3 months.   - CBC WITH DIFFERENTIAL; Future  - MICROALBUMIN CREAT RATIO URINE; Future  - LIPID PROFILE; Future  - TSH; Future    2. Essential hypertension  Chronic, ongoing. At goal.  conitnue lisinopril daily.     3. Obesity (BMI 35.0-39.9 without comorbidity) ([facility])  Chronic, ongoing.  Discussed portion control.  Continue good exercise.  F/U 3 months.       Followup: Return in about 3 months (around 11/6/2018) for Diabetes.

## 2018-08-07 NOTE — ASSESSMENT & PLAN NOTE
A1c 7.6 today.  Still walking with dogs daily.  Weight down 3 pounds. Taking metformin 1000 mg bid.  Reports that she is eating well.  Not eating sweets. Has cut back on rice portions

## 2018-09-21 DIAGNOSIS — E11.8 TYPE 2 DIABETES MELLITUS WITH COMPLICATION (HCC): ICD-10-CM

## 2018-09-21 NOTE — TELEPHONE ENCOUNTER
Was the patient seen in the last year in this department? Yes LOV 08/06/18    Does patient have an active prescription for medications requested? No     Received Request Via: Pharmacy

## 2018-09-23 NOTE — TELEPHONE ENCOUNTER
Requested Prescriptions     Signed Prescriptions Disp Refills   • metformin (GLUCOPHAGE) 1000 MG tablet 180 Tab 1     Sig: Take one tablet by mouth twice daily  with food     Authorizing Provider: AYDIN TOBIN A.P.R.N.

## 2018-10-19 ENCOUNTER — NON-PROVIDER VISIT (OUTPATIENT)
Dept: MEDICAL GROUP | Facility: PHYSICIAN GROUP | Age: 63
End: 2018-10-19
Payer: COMMERCIAL

## 2018-10-19 ENCOUNTER — HOSPITAL ENCOUNTER (OUTPATIENT)
Dept: LAB | Facility: MEDICAL CENTER | Age: 63
End: 2018-10-19
Attending: NURSE PRACTITIONER
Payer: COMMERCIAL

## 2018-10-19 DIAGNOSIS — Z23 NEED FOR VACCINATION: ICD-10-CM

## 2018-10-19 DIAGNOSIS — I10 ESSENTIAL HYPERTENSION: ICD-10-CM

## 2018-10-19 LAB
BASOPHILS # BLD AUTO: 0.7 % (ref 0–1.8)
BASOPHILS # BLD: 0.06 K/UL (ref 0–0.12)
CHOLEST SERPL-MCNC: 141 MG/DL (ref 100–199)
CREAT UR-MCNC: 186.7 MG/DL
EOSINOPHIL # BLD AUTO: 0.04 K/UL (ref 0–0.51)
EOSINOPHIL NFR BLD: 0.5 % (ref 0–6.9)
ERYTHROCYTE [DISTWIDTH] IN BLOOD BY AUTOMATED COUNT: 35.8 FL (ref 35.9–50)
HCT VFR BLD AUTO: 42.3 % (ref 37–47)
HDLC SERPL-MCNC: 52 MG/DL
HGB BLD-MCNC: 13.9 G/DL (ref 12–16)
IMM GRANULOCYTES # BLD AUTO: 0.03 K/UL (ref 0–0.11)
IMM GRANULOCYTES NFR BLD AUTO: 0.3 % (ref 0–0.9)
LDLC SERPL CALC-MCNC: 49 MG/DL
LYMPHOCYTES # BLD AUTO: 2.06 K/UL (ref 1–4.8)
LYMPHOCYTES NFR BLD: 23.5 % (ref 22–41)
MCH RBC QN AUTO: 22.2 PG (ref 27–33)
MCHC RBC AUTO-ENTMCNC: 32.9 G/DL (ref 33.6–35)
MCV RBC AUTO: 67.6 FL (ref 81.4–97.8)
MICROALBUMIN UR-MCNC: 0.8 MG/DL
MICROALBUMIN/CREAT UR: 4 MG/G (ref 0–30)
MONOCYTES # BLD AUTO: 0.58 K/UL (ref 0–0.85)
MONOCYTES NFR BLD AUTO: 6.6 % (ref 0–13.4)
NEUTROPHILS # BLD AUTO: 5.98 K/UL (ref 2–7.15)
NEUTROPHILS NFR BLD: 68.4 % (ref 44–72)
NRBC # BLD AUTO: 0 K/UL
NRBC BLD-RTO: 0 /100 WBC
PLATELET # BLD AUTO: 262 K/UL (ref 164–446)
PMV BLD AUTO: 11.1 FL (ref 9–12.9)
RBC # BLD AUTO: 6.26 M/UL (ref 4.2–5.4)
TRIGL SERPL-MCNC: 199 MG/DL (ref 0–149)
TSH SERPL DL<=0.005 MIU/L-ACNC: 0.76 UIU/ML (ref 0.38–5.33)
WBC # BLD AUTO: 8.8 K/UL (ref 4.8–10.8)

## 2018-10-19 PROCEDURE — 90472 IMMUNIZATION ADMIN EACH ADD: CPT | Performed by: NURSE PRACTITIONER

## 2018-10-19 PROCEDURE — 36415 COLL VENOUS BLD VENIPUNCTURE: CPT

## 2018-10-19 PROCEDURE — 85025 COMPLETE CBC W/AUTO DIFF WBC: CPT

## 2018-10-19 PROCEDURE — 80061 LIPID PANEL: CPT

## 2018-10-19 PROCEDURE — 90471 IMMUNIZATION ADMIN: CPT | Performed by: NURSE PRACTITIONER

## 2018-10-19 PROCEDURE — 90670 PCV13 VACCINE IM: CPT | Performed by: NURSE PRACTITIONER

## 2018-10-19 PROCEDURE — 82043 UR ALBUMIN QUANTITATIVE: CPT

## 2018-10-19 PROCEDURE — 82570 ASSAY OF URINE CREATININE: CPT

## 2018-10-19 PROCEDURE — 90686 IIV4 VACC NO PRSV 0.5 ML IM: CPT | Performed by: NURSE PRACTITIONER

## 2018-10-19 PROCEDURE — 84443 ASSAY THYROID STIM HORMONE: CPT

## 2018-10-19 RX ORDER — LISINOPRIL 10 MG/1
TABLET ORAL
Qty: 90 TAB | Refills: 0 | Status: SHIPPED | OUTPATIENT
Start: 2018-10-19 | End: 2019-02-06 | Stop reason: SDUPTHER

## 2018-10-19 NOTE — TELEPHONE ENCOUNTER
Requested Prescriptions     Signed Prescriptions Disp Refills   • lisinopril (PRINIVIL) 10 MG Tab 90 Tab 0     Sig: Take one tablet by mouth one time daily     Authorizing Provider: AYDIN TOBIN A.P.R.N.

## 2018-10-19 NOTE — PROGRESS NOTES
"Kristie Farley is a 63 y.o. female here for a non-provider visit for:   FLU    Reason for immunization: Annual Flu Vaccine  Immunization records indicate need for vaccine: Yes, confirmed with Epic  Minimum interval has been met for this vaccine: Yes  ABN completed: Not Indicated    Order and dose verified by:   VIS Dated  8/7/2015 was given to patient: Yes  All IAC Questionnaire questions were answered \"No.\"    Patient tolerated injection and no adverse effects were observed or reported: Yes    Pt scheduled for next dose in series: Not Indicated    Kristie Farley is a 63 y.o. female here for a non-provider visit for:   PREVNAR (PCV13) 1 of 1    Reason for immunization: Overdue/Provider Recommended  Immunization records indicate need for vaccine: Yes, confirmed with Epic  Minimum interval has been met for this vaccine: Yes  ABN completed: Not Indicated    Order and dose verified by:   VIS Dated  11/5/2015 was given to patient: Yes  All IAC Questionnaire questions were answered \"No.\"    Patient tolerated injection and no adverse effects were observed or reported: Yes    Pt scheduled for next dose in series: Not Indicated  "

## 2018-10-23 ENCOUNTER — TELEPHONE (OUTPATIENT)
Dept: MEDICAL GROUP | Facility: PHYSICIAN GROUP | Age: 63
End: 2018-10-23

## 2018-10-23 NOTE — LETTER
October 23, 2018         Kristie Farley  [address] [place] [address]        Dear Kristie:      Below are the results from your recent visit:    Labs are back.  Triglycerides have increased to 199, this is usually related to sugars in our diet: treats, candy, alcohol.  Thyroid, kidneys liver all stable.  Have a great day.   ELMIRA Haywood     Resulted Orders   CBC WITH DIFFERENTIAL   Result Value Ref Range    WBC 8.8 4.8 - 10.8 K/uL    RBC 6.26 (H) 4.20 - 5.40 M/uL    Hemoglobin 13.9 12.0 - 16.0 g/dL    Hematocrit 42.3 37.0 - 47.0 %    MCV 67.6 (L) 81.4 - 97.8 fL    MCH 22.2 (L) 27.0 - 33.0 pg    MCHC 32.9 (L) 33.6 - 35.0 g/dL    RDW 35.8 (L) 35.9 - 50.0 fL    Platelet Count 262 164 - 446 K/uL    MPV 11.1 9.0 - 12.9 fL    Neutrophils-Polys 68.40 44.00 - 72.00 %    Lymphocytes 23.50 22.00 - 41.00 %    Monocytes 6.60 0.00 - 13.40 %    Eosinophils 0.50 0.00 - 6.90 %    Basophils 0.70 0.00 - 1.80 %    Immature Granulocytes 0.30 0.00 - 0.90 %    Nucleated RBC 0.00 /100 WBC    Neutrophils (Absolute) 5.98 2.00 - 7.15 K/uL      Comment:      Includes immature neutrophils, if present.    Lymphs (Absolute) 2.06 1.00 - 4.80 K/uL    Monos (Absolute) 0.58 0.00 - 0.85 K/uL    Eos (Absolute) 0.04 0.00 - 0.51 K/uL    Baso (Absolute) 0.06 0.00 - 0.12 K/uL    Immature Granulocytes (abs) 0.03 0.00 - 0.11 K/uL    NRBC (Absolute) 0.00 K/uL   MICROALBUMIN CREAT RATIO URINE   Result Value Ref Range    Creatinine, Urine 186.70 mg/dL    Microalbumin, Urine Random 0.8 mg/dL    Micro Alb Creat Ratio 4 0 - 30 mg/g   LIPID PROFILE   Result Value Ref Range    Cholesterol,Tot 141 100 - 199 mg/dL    Triglycerides 199 (H) 0 - 149 mg/dL    HDL 52 >=40 mg/dL    LDL 49 <100 mg/dL   TSH   Result Value Ref Range    TSH 0.760 0.380 - 5.330 uIU/mL      Comment:      Please note new reference ranges effective 12/14/2017 10:00 AM  Pregnant Females, 1st Trimester  0.050-3.700  Pregnant Females, 2nd Trimester  0.310-4.350  Pregnant Females, 3rd Trimester   0.028-5.340       If you have any questions or concerns, please don't hesitate to call.    Electronically Signed

## 2018-10-23 NOTE — TELEPHONE ENCOUNTER
----- Message from ELMIRA Haywood sent at 10/23/2018 12:42 PM PDT -----  Kristie  Labs are back.  Triglycerides have increased to 199, this is usually related to sugars in our diet: treats, candy, alcohol.  Thyroid, kidneys liver all stable.  Have a great day.  ELMIRA Haywood

## 2018-11-13 ENCOUNTER — OFFICE VISIT (OUTPATIENT)
Dept: MEDICAL GROUP | Facility: PHYSICIAN GROUP | Age: 63
End: 2018-11-13
Payer: COMMERCIAL

## 2018-11-13 VITALS
OXYGEN SATURATION: 100 % | HEIGHT: 57 IN | DIASTOLIC BLOOD PRESSURE: 72 MMHG | BODY MASS INDEX: 36.89 KG/M2 | WEIGHT: 171 LBS | SYSTOLIC BLOOD PRESSURE: 122 MMHG | HEART RATE: 77 BPM | TEMPERATURE: 97.3 F | RESPIRATION RATE: 14 BRPM

## 2018-11-13 DIAGNOSIS — E66.9 OBESITY (BMI 35.0-39.9 WITHOUT COMORBIDITY): ICD-10-CM

## 2018-11-13 DIAGNOSIS — E78.1 HYPERTRIGLYCERIDEMIA: ICD-10-CM

## 2018-11-13 DIAGNOSIS — E11.65 UNCONTROLLED TYPE 2 DIABETES MELLITUS WITH HYPERGLYCEMIA (HCC): ICD-10-CM

## 2018-11-13 DIAGNOSIS — I10 ESSENTIAL HYPERTENSION: ICD-10-CM

## 2018-11-13 LAB
HBA1C MFR BLD: 6.8 % (ref ?–5.8)
INT CON NEG: NEGATIVE
INT CON POS: POSITIVE

## 2018-11-13 PROCEDURE — 83036 HEMOGLOBIN GLYCOSYLATED A1C: CPT | Performed by: NURSE PRACTITIONER

## 2018-11-13 PROCEDURE — 99214 OFFICE O/P EST MOD 30 MIN: CPT | Performed by: NURSE PRACTITIONER

## 2018-11-13 RX ORDER — ATORVASTATIN CALCIUM 10 MG/1
10 TABLET, FILM COATED ORAL DAILY
Qty: 90 TAB | Refills: 3 | Status: SHIPPED | OUTPATIENT
Start: 2018-11-13 | End: 2019-11-12

## 2018-11-13 ASSESSMENT — PATIENT HEALTH QUESTIONNAIRE - PHQ9: CLINICAL INTERPRETATION OF PHQ2 SCORE: 0

## 2018-11-13 NOTE — PROGRESS NOTES
Chief Complaint   Patient presents with   • Follow-Up   • Diabetes       Subjective:   Kristie Farley is a 63 y.o. female here today for evaluation and management of:    DM (diabetes mellitus), type 2, uncontrolled  Reports BS running around 115 this morning.   Taking Metformin one in morning, januvia at lunch and one at dinner.  Due for A1c today.      Essential hypertension  bp today at goal  122/72.  Taking lisinopril daily.  No chest pain reported.     Hypertriglyceridemia  Triglycerides remain elevated at 199 today.  Watching sugars and rare alcohol.  Walking most days/riding stationery bike.     Obesity (BMI 35.0-39.9 without comorbidity) ([facility])  BMI 37 today.  Up 6 pounds.             Current medicines (including changes today)  Current Outpatient Prescriptions   Medication Sig Dispense Refill   • atorvastatin (LIPITOR) 10 MG Tab Take 1 Tab by mouth every day. 90 Tab 3   • lisinopril (PRINIVIL) 10 MG Tab Take one tablet by mouth one time daily 90 Tab 0   • metformin (GLUCOPHAGE) 1000 MG tablet Take one tablet by mouth twice daily  with food 180 Tab 1   • SITagliptin (JANUVIA) 50 MG Tab Take 1 Tab by mouth every day. 30 Tab 3   • glucose blood (ACCU-CHEK SMARTVIEW) strip 1 Strip by Other route every day. 50 Strip 6   • Blood Glucose Monitoring Suppl (ACCU-CHEK ADVANTAGE DIABETES) KIT Test blood sugar every morning. 1 Kit 0   • Lancet Device MISC Test blood sugar every morning. 100 Each 4     No current facility-administered medications for this visit.      She  has a past medical history of Diabetes ([facility]); Hyperplastic colon polyp (8/30/13); Hypertension; Hypertriglyceridemia (5/9/2013); Osteopenia; Psoriasis; and Psoriatic arthritis ([facility]). She also has no past medical history of Cancer ([facility]).    ROS as stated in hpi  No chest pain, no shortness of breath, no abdominal pain       Objective:     Blood pressure 122/72, pulse 77, temperature 36.3 °C (97.3 °F), temperature source Temporal, resp. rate 14, height 1.448 m  "(4' 9\"), weight 77.6 kg (171 lb), SpO2 100 %, not currently breastfeeding. Body mass index is 37 kg/m².  Up 6 pounds   Physical Exam:  Constitutional: Alert, no distress.  Skin: Warm, dry, good turgor,no cyanosis, no rashes in visible areas.  Eye: Equal, round and reactive, conjunctiva clear, lids normal.  Ears: No tenderness, no discharge.  External canals are without any significant edema or erythema.  Tympanic membranes are without any inflammation, no effusion.  Gross auditory acuity is intact.  Nose: symmetrical without tenderness, no discharge.  Mouth/Throat: lips without lesion.  Oropharynx clear.  Throat without erythema, exudates or tonsillar enlargement.  Neck: Trachea midline, no masses, no obvious thyroid enlargement.. No cervical or supraclavicular lymphadenopathy. Range of motion within normal limits.  Neuro: Cranial nerves 2-12 grossly intact.  No sensory deficit.  Respiratory: Unlabored respiratory effort, lungs clear to auscultation, no wheezes, no ronchi.  Cardiovascular: Normal S1, S2, no murmur, no edema.  Abdomen: Soft, non-tender, no masses, no guarding,  no hepatosplenomegaly.  Psych: Alert and oriented x3, normal affect and mood and judgement.        Assessment and Plan:   The following treatment plan was discussed    1. Uncontrolled type 2 diabetes mellitus with hyperglycemia (HCC)  Chronic, ongoing. Worsening.  A1c 6.8 today.  Continue with metformin and januvia.  Discussed weight loss.  Goal is to lose 3-4 pounds in the next 6 months.  Portion control  Continue walking.     2. Hypertriglyceridemia  Chronic, ongoing.  Not at goal.  !99 today.  Discussed weight loss, diet and exercise.  Continue with atorvastatin.  Refill provided.   - atorvastatin (LIPITOR) 10 MG Tab; Take 1 Tab by mouth every day.  Dispense: 90 Tab; Refill: 3    3. Essential hypertension  Chronic, ongoing at goal.  Continue lisinopril gokul.     4. Obesity (BMI 35.0-39.9 without comorbidity) (HCC)  Chronic, ongoing. Weight " is up 6 pounds.  Goal to lose 3-4 pounds prior to next appointment.  Monitor and follow.       Followup: Return in about 6 months (around 5/13/2019) for Diabetes.         Educated in proper administration of medication(s) ordered today including safety, possible SE, risks, benefits, rationale and alternatives to therapy.     Please note that this dictation was created using voice recognition software. I have made every reasonable attempt to correct obvious errors, but I expect that there are errors of grammar and possibly content that I did not discover before finalizing the note.

## 2018-11-13 NOTE — ASSESSMENT & PLAN NOTE
Reports BS running around 115 this morning.   Taking Metformin one in morning, januvia at lunch and one at dinner.  Due for A1c today.

## 2018-11-13 NOTE — ASSESSMENT & PLAN NOTE
Triglycerides remain elevated at 199 today.  Watching sugars and rare alcohol.  Walking most days/riding stationery bike.

## 2018-12-13 RX ORDER — SITAGLIPTIN 50 MG/1
TABLET, FILM COATED ORAL
Qty: 30 TAB | Refills: 6 | Status: SHIPPED | OUTPATIENT
Start: 2018-12-13 | End: 2019-07-08 | Stop reason: SDUPTHER

## 2018-12-13 NOTE — TELEPHONE ENCOUNTER
Requested Prescriptions     Signed Prescriptions Disp Refills   • JANUVIA 50 MG Tab 30 Tab 6     Sig: TAKE ONE TABLET BY MOUTH ONE TIME DAILY     Authorizing Provider: AYDIN TOBIN A.P.R.N.

## 2019-02-06 DIAGNOSIS — I10 ESSENTIAL HYPERTENSION: ICD-10-CM

## 2019-02-06 RX ORDER — LISINOPRIL 10 MG/1
TABLET ORAL
Qty: 90 TAB | Refills: 2 | Status: SHIPPED | OUTPATIENT
Start: 2019-02-06 | End: 2019-10-28 | Stop reason: SDUPTHER

## 2019-02-07 NOTE — TELEPHONE ENCOUNTER
Requested Prescriptions     Signed Prescriptions Disp Refills   • lisinopril (PRINIVIL) 10 MG Tab 90 Tab 2     Sig: TAKE ONE TABLET BY MOUTH ONE TIME DAILY     Authorizing Provider: AYDIN TOBIN A.P.R.N.

## 2019-03-14 NOTE — TELEPHONE ENCOUNTER
Was the patient seen in the last year in this department? Yes LOV 11/13/18    Does patient have an active prescription for medications requested? No     Received Request Via: Pharmacy

## 2019-03-14 NOTE — TELEPHONE ENCOUNTER
Requested Prescriptions     Signed Prescriptions Disp Refills   • glucose blood (ACCU-CHEK SMARTVIEW) strip 50 Strip 6     Si Strip by Other route every day.     Authorizing Provider: AYDIN TOBIN A.P.R.N.

## 2019-04-04 DIAGNOSIS — E11.8 TYPE 2 DIABETES MELLITUS WITH COMPLICATION (HCC): ICD-10-CM

## 2019-04-05 NOTE — TELEPHONE ENCOUNTER
Requested Prescriptions     Signed Prescriptions Disp Refills   • metformin (GLUCOPHAGE) 1000 MG tablet 180 Tab 0     Sig: TAKE ONE TABLET BY MOUTH TWICE DAILY WITH FOOD     Authorizing Provider: AYDIN TOBIN A.P.R.N.

## 2019-05-14 ENCOUNTER — OFFICE VISIT (OUTPATIENT)
Dept: MEDICAL GROUP | Facility: PHYSICIAN GROUP | Age: 64
End: 2019-05-14
Payer: COMMERCIAL

## 2019-05-14 VITALS
HEIGHT: 57 IN | WEIGHT: 166 LBS | SYSTOLIC BLOOD PRESSURE: 116 MMHG | TEMPERATURE: 97.3 F | BODY MASS INDEX: 35.81 KG/M2 | RESPIRATION RATE: 14 BRPM | OXYGEN SATURATION: 97 % | DIASTOLIC BLOOD PRESSURE: 80 MMHG | HEART RATE: 74 BPM

## 2019-05-14 DIAGNOSIS — E11.9 TYPE 2 DIABETES MELLITUS WITHOUT COMPLICATION, WITHOUT LONG-TERM CURRENT USE OF INSULIN (HCC): ICD-10-CM

## 2019-05-14 DIAGNOSIS — E11.65 UNCONTROLLED TYPE 2 DIABETES MELLITUS WITH HYPERGLYCEMIA (HCC): ICD-10-CM

## 2019-05-14 DIAGNOSIS — Z12.11 SCREEN FOR COLON CANCER: ICD-10-CM

## 2019-05-14 DIAGNOSIS — E66.9 OBESITY (BMI 35.0-39.9 WITHOUT COMORBIDITY): ICD-10-CM

## 2019-05-14 DIAGNOSIS — I10 ESSENTIAL HYPERTENSION: ICD-10-CM

## 2019-05-14 DIAGNOSIS — Z12.39 SCREENING FOR BREAST CANCER: ICD-10-CM

## 2019-05-14 LAB
HBA1C MFR BLD: 6.2 % (ref 0–5.6)
INT CON NEG: NEGATIVE
INT CON POS: POSITIVE

## 2019-05-14 PROCEDURE — 99214 OFFICE O/P EST MOD 30 MIN: CPT | Performed by: NURSE PRACTITIONER

## 2019-05-14 PROCEDURE — 83036 HEMOGLOBIN GLYCOSYLATED A1C: CPT | Performed by: NURSE PRACTITIONER

## 2019-05-14 ASSESSMENT — PATIENT HEALTH QUESTIONNAIRE - PHQ9: CLINICAL INTERPRETATION OF PHQ2 SCORE: 0

## 2019-05-14 NOTE — ASSESSMENT & PLAN NOTE
A1c 6.2.  Weight loss 5 pounds.  Walking daily.  Active and feeling better.no pain in feet reported, no numbness or tingling.    Taking metformin, januvia.  Check BS daily.

## 2019-05-14 NOTE — PROGRESS NOTES
"Chief Complaint   Patient presents with   • Follow-Up   • Diabetes       Subjective:   Kristie Farley is a 63 y.o. female here today for evaluation and management of:    DM (diabetes mellitus), type 2, uncontrolled  A1c 6.2.  Weight loss 5 pounds.  Walking daily.  Active and feeling better.no pain in feet reported, no numbness or tingling.    Taking metformin, januvia.  Check BS daily.      Obesity (BMI 35.0-39.9 without comorbidity) (Formerly [facility])  BMI 35.92.  Weight is down 5 pounds.  Walking daily.     Essential hypertension  /80 today.  Taking 10 mg lisinopril daily.  No chest pain reported.  Walking daily.            Current medicines (including changes today)  Current Outpatient Prescriptions   Medication Sig Dispense Refill   • metformin (GLUCOPHAGE) 1000 MG tablet TAKE ONE TABLET BY MOUTH TWICE DAILY WITH FOOD  180 Tab 0   • glucose blood (ACCU-CHEK SMARTVIEW) strip 1 Strip by Other route every day. 50 Strip 6   • lisinopril (PRINIVIL) 10 MG Tab TAKE ONE TABLET BY MOUTH ONE TIME DAILY  90 Tab 2   • JANUVIA 50 MG Tab TAKE ONE TABLET BY MOUTH ONE TIME DAILY  30 Tab 6   • atorvastatin (LIPITOR) 10 MG Tab Take 1 Tab by mouth every day. 90 Tab 3   • Blood Glucose Monitoring Suppl (ACCU-CHEK ADVANTAGE DIABETES) KIT Test blood sugar every morning. 1 Kit 0   • Lancet Device MISC Test blood sugar every morning. 100 Each 4     No current facility-administered medications for this visit.      She  has a past medical history of Diabetes (Formerly [facility]); Hyperplastic colon polyp (8/30/13); Hypertension; Hypertriglyceridemia (5/9/2013); Osteopenia; Psoriasis; and Psoriatic arthritis (Formerly [facility]). She also has no past medical history of Cancer (Formerly [facility]).    ROS as stated in hpi  No chest pain, no shortness of breath, no abdominal pain       Objective:     /80 (BP Location: Left arm, Patient Position: Sitting)   Pulse 74   Temp 36.3 °C (97.3 °F) (Temporal)   Resp 14   Ht 1.448 m (4' 9\")   Wt 75.3 kg (166 lb)   SpO2 97%  Body mass index is " 35.92 kg/m². weight is down 5 pounds  Physical Exam:  Constitutional: Alert, no distress.  Skin: Warm, dry, good turgor,no cyanosis, no rashes in visible areas.  Eye: Equal, round and reactive, conjunctiva clear, lids normal.  Ears: No tenderness, no discharge.  External canals are without any significant edema or erythema.  Tympanic membranes are without any inflammation, no effusion.  Gross auditory acuity is intact.  Nose: symmetrical without tenderness, no discharge.  Mouth/Throat: lips without lesion.  Oropharynx clear.  Throat without erythema, exudates or tonsillar enlargement.  Neck: Trachea midline, no masses, no obvious thyroid enlargement.. No cervical or supraclavicular lymphadenopathy. Range of motion within normal limits.  Neuro: Cranial nerves 2-12 grossly intact.  No sensory deficit.  Respiratory: Unlabored respiratory effort, lungs clear to auscultation, no wheezes, no ronchi.  Cardiovascular: Normal S1, S2, no murmur, no edema.  Abdomen: Soft, non-tender, no masses, no guarding,  no hepatosplenomegaly.  Monofilament testing with a 10 gram force: sensation intact: intact bilaterally  Visual Inspection: Feet without maceration, ulcers, fissures.  Pedal pulses: intact bilaterally    Psych: Alert and oriented x3, normal affect and mood and judgement.        Assessment and Plan:   The following treatment plan was discussed    1. Type 2 diabetes mellitus without complication, without long-term current use of insulin (HCC)  Chronic, ongoing, improving.  A1c 6.2.  Continue with weight loss, walking and current meds. Is on Ace and statin.   Return in 6 months.  Yearly labs due prior.  Orders provided.   - POCT Hemoglobin A1C  - Diabetic Monofilament LE Exam  - CBC WITH DIFFERENTIAL; Future  - Comp Metabolic Panel; Future  - HEMOGLOBIN A1C; Future  - MICROALBUMIN CREAT RATIO URINE; Future  - Lipid Profile; Future    2. Screening for breast cancer  Due for mammo.  Order given. Monitor and follow.   -  MA-SCREEN MAMMO W/CAD-BILAT; Future    3. Uncontrolled type 2 diabetes mellitus with hyperglycemia (HCC)  See #1    4. Screen for colon cancer  Due for 5 year recall.  Referral placed.  Monitor.   - REFERRAL TO GASTROENTEROLOGY    5. Obesity (BMI 35.0-39.9 without comorbidity) (HCC)  Chronic, ongoing, improved.  Weight down 5 pounds.  Continue good diet and exercise.  Goal of 5 more pounds over the next 6 months.  Monitor and follow.     6. Essential hypertension  Chronic, onoing. Stable.  At goal.  Continue lisinopril 10 mg daily.        Followup: Return in about 6 months (around 11/14/2019) for Diabetes.         Educated in proper administration of medication(s) ordered today including safety, possible SE, risks, benefits, rationale and alternatives to therapy.     Please note that this dictation was created using voice recognition software. I have made every reasonable attempt to correct obvious errors, but I expect that there are errors of grammar and possibly content that I did not discover before finalizing the note.

## 2019-05-16 ENCOUNTER — TELEPHONE (OUTPATIENT)
Dept: MEDICAL GROUP | Facility: PHYSICIAN GROUP | Age: 64
End: 2019-05-16

## 2019-05-16 ENCOUNTER — HOSPITAL ENCOUNTER (OUTPATIENT)
Dept: RADIOLOGY | Facility: MEDICAL CENTER | Age: 64
End: 2019-05-16
Attending: NURSE PRACTITIONER
Payer: COMMERCIAL

## 2019-05-16 DIAGNOSIS — Z12.39 SCREENING FOR BREAST CANCER: ICD-10-CM

## 2019-05-16 PROCEDURE — 77067 SCR MAMMO BI INCL CAD: CPT

## 2019-05-16 NOTE — LETTER
May 16, 2019         Kristie Farley  [address] [place] [address]        Dear Kristie:      Below are the results from your recent visit:    Please notify patient that her mammogram results have been read.  There was no evidence of malignancy or suspicious areas of concern.   Recommend yearly follow up screening.   Always good news!   ELMIRA Haywood     Resulted Orders   MA-SCREEN MAMMO W/CAD-BILAT    Narrative    5/16/2019 8:45 AM    HISTORY/REASON FOR EXAM:  Routine Mammographic Screening.      TECHNIQUE/EXAM DESCRIPTION:  Bilateral digital screening mammography was performed and interpreted with CAD.    COMPARISON:   Mammogram 12/8/2017 and 9/21/2015    FINDINGS:    There are scattered areas of fibroglandular density. There is no dominant mass, suspicious calcification, or any secondary malignant sign.      Impression    1.  Breasts have scattered areas of fibroglandular density, with no radiographic evidence of malignancy and no interval change.    2.  Screening mammogram in one year is recommended.      R1 - Category 1:  Negative     If you have any questions or concerns, please don't hesitate to call    Electronically Signed

## 2019-05-17 NOTE — TELEPHONE ENCOUNTER
----- Message from ELMIRA Haywood sent at 5/16/2019  5:19 PM PDT -----  Please notify patient that her mammogram results have been read.  There was no evidence of malignancy or suspicious areas of concern.   Recommend yearly follow up screening.  Always good news!  ELMIRA Haywood

## 2019-07-04 DIAGNOSIS — E11.8 TYPE 2 DIABETES MELLITUS WITH COMPLICATION (HCC): ICD-10-CM

## 2019-07-08 RX ORDER — SITAGLIPTIN 50 MG/1
TABLET, FILM COATED ORAL
Qty: 30 TAB | Refills: 6 | Status: SHIPPED | OUTPATIENT
Start: 2019-07-08 | End: 2020-02-03

## 2019-07-08 NOTE — TELEPHONE ENCOUNTER
Was the patient seen in the last year in this department? Yes LOV 5/14/19    Does patient have an active prescription for medications requested? No     Received Request Via: Pharmacy

## 2019-07-09 NOTE — TELEPHONE ENCOUNTER
Requested Prescriptions     Signed Prescriptions Disp Refills   • metformin (GLUCOPHAGE) 1000 MG tablet 180 Tab 3     Sig: TAKE ONE TABLET BY MOUTH TWICE DAILY WITH FOOD     Authorizing Provider: AYDIN TOBIN A.P.R.N.

## 2019-10-28 DIAGNOSIS — I10 ESSENTIAL HYPERTENSION: ICD-10-CM

## 2019-10-28 RX ORDER — LISINOPRIL 10 MG/1
TABLET ORAL
Qty: 90 TAB | Refills: 1 | Status: SHIPPED | OUTPATIENT
Start: 2019-10-28 | End: 2020-04-29

## 2019-10-28 NOTE — TELEPHONE ENCOUNTER
Requested Prescriptions     Signed Prescriptions Disp Refills   • lisinopril (PRINIVIL) 10 MG Tab 90 Tab 1     Sig: TAKE ONE TABLET BY MOUTH ONE TIME DAILY     Authorizing Provider: AYDIN TOBIN A.P.R.N.

## 2019-11-04 ENCOUNTER — HOSPITAL ENCOUNTER (OUTPATIENT)
Dept: LAB | Facility: MEDICAL CENTER | Age: 64
End: 2019-11-04
Attending: NURSE PRACTITIONER
Payer: COMMERCIAL

## 2019-11-04 DIAGNOSIS — E11.9 TYPE 2 DIABETES MELLITUS WITHOUT COMPLICATION, WITHOUT LONG-TERM CURRENT USE OF INSULIN (HCC): ICD-10-CM

## 2019-11-04 LAB
ALBUMIN SERPL BCP-MCNC: 4.7 G/DL (ref 3.2–4.9)
ALBUMIN/GLOB SERPL: 1.5 G/DL
ALP SERPL-CCNC: 58 U/L (ref 30–99)
ALT SERPL-CCNC: 7 U/L (ref 2–50)
ANION GAP SERPL CALC-SCNC: 7 MMOL/L (ref 0–11.9)
AST SERPL-CCNC: 18 U/L (ref 12–45)
BASOPHILS # BLD AUTO: 1 % (ref 0–1.8)
BASOPHILS # BLD: 0.06 K/UL (ref 0–0.12)
BILIRUB SERPL-MCNC: 0.7 MG/DL (ref 0.1–1.5)
BUN SERPL-MCNC: 13 MG/DL (ref 8–22)
CALCIUM SERPL-MCNC: 9.5 MG/DL (ref 8.5–10.5)
CHLORIDE SERPL-SCNC: 105 MMOL/L (ref 96–112)
CHOLEST SERPL-MCNC: 128 MG/DL (ref 100–199)
CO2 SERPL-SCNC: 28 MMOL/L (ref 20–33)
CREAT SERPL-MCNC: 0.74 MG/DL (ref 0.5–1.4)
CREAT UR-MCNC: 91.4 MG/DL
EOSINOPHIL # BLD AUTO: 0.05 K/UL (ref 0–0.51)
EOSINOPHIL NFR BLD: 0.8 % (ref 0–6.9)
ERYTHROCYTE [DISTWIDTH] IN BLOOD BY AUTOMATED COUNT: 36.4 FL (ref 35.9–50)
EST. AVERAGE GLUCOSE BLD GHB EST-MCNC: 128 MG/DL
GLOBULIN SER CALC-MCNC: 3.2 G/DL (ref 1.9–3.5)
GLUCOSE SERPL-MCNC: 122 MG/DL (ref 65–99)
HBA1C MFR BLD: 6.1 % (ref 0–5.6)
HCT VFR BLD AUTO: 39.4 % (ref 37–47)
HDLC SERPL-MCNC: 45 MG/DL
HGB BLD-MCNC: 12.9 G/DL (ref 12–16)
IMM GRANULOCYTES # BLD AUTO: 0.04 K/UL (ref 0–0.11)
IMM GRANULOCYTES NFR BLD AUTO: 0.6 % (ref 0–0.9)
LDLC SERPL CALC-MCNC: 37 MG/DL
LYMPHOCYTES # BLD AUTO: 2.31 K/UL (ref 1–4.8)
LYMPHOCYTES NFR BLD: 37.3 % (ref 22–41)
MCH RBC QN AUTO: 22.6 PG (ref 27–33)
MCHC RBC AUTO-ENTMCNC: 32.7 G/DL (ref 33.6–35)
MCV RBC AUTO: 68.9 FL (ref 81.4–97.8)
MICROALBUMIN UR-MCNC: 1.1 MG/DL
MICROALBUMIN/CREAT UR: 12 MG/G (ref 0–30)
MONOCYTES # BLD AUTO: 0.42 K/UL (ref 0–0.85)
MONOCYTES NFR BLD AUTO: 6.8 % (ref 0–13.4)
NEUTROPHILS # BLD AUTO: 3.32 K/UL (ref 2–7.15)
NEUTROPHILS NFR BLD: 53.5 % (ref 44–72)
NRBC # BLD AUTO: 0 K/UL
NRBC BLD-RTO: 0 /100 WBC
PLATELET # BLD AUTO: 259 K/UL (ref 164–446)
PMV BLD AUTO: 10.5 FL (ref 9–12.9)
POTASSIUM SERPL-SCNC: 4.2 MMOL/L (ref 3.6–5.5)
PROT SERPL-MCNC: 7.9 G/DL (ref 6–8.2)
RBC # BLD AUTO: 5.72 M/UL (ref 4.2–5.4)
SODIUM SERPL-SCNC: 140 MMOL/L (ref 135–145)
TRIGL SERPL-MCNC: 230 MG/DL (ref 0–149)
WBC # BLD AUTO: 6.2 K/UL (ref 4.8–10.8)

## 2019-11-04 PROCEDURE — 82043 UR ALBUMIN QUANTITATIVE: CPT

## 2019-11-04 PROCEDURE — 85025 COMPLETE CBC W/AUTO DIFF WBC: CPT

## 2019-11-04 PROCEDURE — 36415 COLL VENOUS BLD VENIPUNCTURE: CPT

## 2019-11-04 PROCEDURE — 82570 ASSAY OF URINE CREATININE: CPT

## 2019-11-04 PROCEDURE — 80061 LIPID PANEL: CPT

## 2019-11-04 PROCEDURE — 80053 COMPREHEN METABOLIC PANEL: CPT

## 2019-11-04 PROCEDURE — 83036 HEMOGLOBIN GLYCOSYLATED A1C: CPT

## 2019-11-05 ENCOUNTER — TELEPHONE (OUTPATIENT)
Dept: MEDICAL GROUP | Facility: PHYSICIAN GROUP | Age: 64
End: 2019-11-05

## 2019-11-05 NOTE — LETTER
November 5, 2019         Kristie Farley  [address] [place] [address]        Dear Kristie:      Below are the results from your recent visit:    Thanks for getting your lab work done prior to our appointment on the 12th.  We will review the lab work at that time.  See you soon     ELMIRA Haywood     Resulted Orders   Lipid Profile   Result Value Ref Range    Cholesterol,Tot 128 100 - 199 mg/dL    Triglycerides 230 (H) 0 - 149 mg/dL    HDL 45 >=40 mg/dL    LDL 37 <100 mg/dL    Narrative    Fast 8-10 hours, OK to drink water as needed during fast,  take medications per your provider's instructions.   HEMOGLOBIN A1C   Result Value Ref Range    Glycohemoglobin 6.1 (H) 0.0 - 5.6 %      Comment:      Increased risk for diabetes:  5.7 -6.4%  Diabetes:  >6.4%  Glycemic control for adults with diabetes:  <7.0%  The above interpretations are per ADA guidelines.  Diagnosis  of diabetes mellitus on the basis of elevated Hemoglobin A1c  should be confirmed by repeating the Hb A1c test.      Est Avg Glucose 128 mg/dL      Comment:      The eAG calculation is based on the A1c-Derived Daily Glucose  (ADAG) study.  See the ADA's website for additional information.      Narrative    Fast 8-10 hours, OK to drink water as needed during fast,  take medications per your provider's instructions.   MICROALBUMIN CREAT RATIO URINE   Result Value Ref Range    Creatinine, Urine 91.40 mg/dL    Microalbumin, Urine Random 1.1 mg/dL    Micro Alb Creat Ratio 12 0 - 30 mg/g   Comp Metabolic Panel   Result Value Ref Range    Sodium 140 135 - 145 mmol/L    Potassium 4.2 3.6 - 5.5 mmol/L    Chloride 105 96 - 112 mmol/L    Co2 28 20 - 33 mmol/L    Anion Gap 7.0 0.0 - 11.9    Glucose 122 (H) 65 - 99 mg/dL    Bun 13 8 - 22 mg/dL    Creatinine 0.74 0.50 - 1.40 mg/dL    Calcium 9.5 8.5 - 10.5 mg/dL    AST(SGOT) 18 12 - 45 U/L    ALT(SGPT) 7 2 - 50 U/L    Alkaline Phosphatase 58 30 - 99 U/L    Total Bilirubin 0.7 0.1 - 1.5 mg/dL    Albumin 4.7 3.2 - 4.9  g/dL    Total Protein 7.9 6.0 - 8.2 g/dL    Globulin 3.2 1.9 - 3.5 g/dL    A-G Ratio 1.5 g/dL    Narrative    Fast 8-10 hours, OK to drink water as needed during fast,  take medications per your provider's instructions.   CBC WITH DIFFERENTIAL   Result Value Ref Range    WBC 6.2 4.8 - 10.8 K/uL    RBC 5.72 (H) 4.20 - 5.40 M/uL    Hemoglobin 12.9 12.0 - 16.0 g/dL    Hematocrit 39.4 37.0 - 47.0 %    MCV 68.9 (L) 81.4 - 97.8 fL    MCH 22.6 (L) 27.0 - 33.0 pg    MCHC 32.7 (L) 33.6 - 35.0 g/dL    RDW 36.4 35.9 - 50.0 fL    Platelet Count 259 164 - 446 K/uL    MPV 10.5 9.0 - 12.9 fL    Neutrophils-Polys 53.50 44.00 - 72.00 %    Lymphocytes 37.30 22.00 - 41.00 %    Monocytes 6.80 0.00 - 13.40 %    Eosinophils 0.80 0.00 - 6.90 %    Basophils 1.00 0.00 - 1.80 %    Immature Granulocytes 0.60 0.00 - 0.90 %    Nucleated RBC 0.00 /100 WBC    Neutrophils (Absolute) 3.32 2.00 - 7.15 K/uL      Comment:      Includes immature neutrophils, if present.    Lymphs (Absolute) 2.31 1.00 - 4.80 K/uL    Monos (Absolute) 0.42 0.00 - 0.85 K/uL    Eos (Absolute) 0.05 0.00 - 0.51 K/uL    Baso (Absolute) 0.06 0.00 - 0.12 K/uL    Immature Granulocytes (abs) 0.04 0.00 - 0.11 K/uL    NRBC (Absolute) 0.00 K/uL    Narrative    Fast 8-10 hours, OK to drink water as needed during fast,  take medications per your provider's instructions.   ESTIMATED GFR   Result Value Ref Range    GFR If African American >60 >60 mL/min/1.73 m 2    GFR If Non African American >60 >60 mL/min/1.73 m 2    Narrative    Fast 8-10 hours, OK to drink water as needed during fast,  take medications per your provider's instructions.     If you have any questions or concerns, please don't hesitate to call.    Electronically Signed

## 2019-11-12 ENCOUNTER — OFFICE VISIT (OUTPATIENT)
Dept: MEDICAL GROUP | Facility: PHYSICIAN GROUP | Age: 64
End: 2019-11-12
Payer: COMMERCIAL

## 2019-11-12 VITALS
SYSTOLIC BLOOD PRESSURE: 120 MMHG | HEART RATE: 88 BPM | OXYGEN SATURATION: 96 % | RESPIRATION RATE: 14 BRPM | BODY MASS INDEX: 36.68 KG/M2 | WEIGHT: 170 LBS | DIASTOLIC BLOOD PRESSURE: 82 MMHG | TEMPERATURE: 97.5 F | HEIGHT: 57 IN

## 2019-11-12 DIAGNOSIS — L40.50 PSORIATIC ARTHRITIS (HCC): ICD-10-CM

## 2019-11-12 DIAGNOSIS — E78.1 HYPERTRIGLYCERIDEMIA: ICD-10-CM

## 2019-11-12 DIAGNOSIS — Z11.59 NEED FOR HEPATITIS B SCREENING TEST: ICD-10-CM

## 2019-11-12 DIAGNOSIS — I10 ESSENTIAL HYPERTENSION: ICD-10-CM

## 2019-11-12 DIAGNOSIS — Z12.11 SCREEN FOR COLON CANCER: ICD-10-CM

## 2019-11-12 DIAGNOSIS — Z11.59 NEED FOR HEPATITIS C SCREENING TEST: ICD-10-CM

## 2019-11-12 DIAGNOSIS — Z23 NEED FOR VACCINATION: ICD-10-CM

## 2019-11-12 DIAGNOSIS — E11.65 UNCONTROLLED TYPE 2 DIABETES MELLITUS WITH HYPERGLYCEMIA (HCC): ICD-10-CM

## 2019-11-12 PROCEDURE — 90471 IMMUNIZATION ADMIN: CPT | Performed by: NURSE PRACTITIONER

## 2019-11-12 PROCEDURE — 99214 OFFICE O/P EST MOD 30 MIN: CPT | Mod: 25 | Performed by: NURSE PRACTITIONER

## 2019-11-12 PROCEDURE — 90686 IIV4 VACC NO PRSV 0.5 ML IM: CPT | Performed by: NURSE PRACTITIONER

## 2019-11-12 RX ORDER — ROSUVASTATIN CALCIUM 20 MG/1
20 TABLET, COATED ORAL EVERY EVENING
Qty: 30 TAB | Refills: 11 | Status: SHIPPED | OUTPATIENT
Start: 2019-11-12 | End: 2020-12-14

## 2019-11-12 NOTE — ASSESSMENT & PLAN NOTE
BP today at goal 120/82.  Walking dog 1.5-2 miles daily.  Taking lisinopril 10 mg.  No chest pain reported.

## 2019-11-12 NOTE — ASSESSMENT & PLAN NOTE
Triglycerides have increased despite walking daily.  Watching sweets.  Taking atovastatin 10 mg daily.  Will consider switching to rosuvastatin.

## 2019-11-12 NOTE — PROGRESS NOTES
Chief Complaint   Patient presents with   • Follow-Up   • Diabetes   • Immunizations       Subjective:   Kristie Farley is a 64 y.o. female here today for evaluation and management of:    DM (diabetes mellitus), type 2, uncontrolled  Results for KRISTIE FARLEY (MRN [id number]) as of 11/12/2019 08:39   Ref. Range 11/4/2019 07:29   A-G Ratio Latest Units: g/dL 1.5   Glycohemoglobin Latest Ref Range: 0.0 - 5.6 % 6.1 (H)   Estim. Avg Glu Latest Units: mg/dL 128   Cholesterol,Tot Latest Ref Range: 100 - 199 mg/dL 128   Triglycerides Latest Ref Range: 0 - 149 mg/dL 230 (H)   HDL Latest Ref Range: >=40 mg/dL 45   LDL Latest Ref Range: <100 mg/dL 37   Taking metformin and januvia daily    Essential hypertension  BP today at goal 120/82.  Walking dog 1.5-2 miles daily.  Taking lisinopril 10 mg.  No chest pain reported.      Hypertriglyceridemia  Triglycerides have increased despite walking daily.  Watching sweets.  Taking atovastatin 10 mg daily.  Will consider switching to rosuvastatin.     Psoriatic arthritis  Continues seeing Dr. Calvillo yearly         Current medicines (including changes today)  Current Outpatient Medications   Medication Sig Dispense Refill   • rosuvastatin (CRESTOR) 20 MG Tab Take 1 Tab by mouth every evening. 30 Tab 11   • lisinopril (PRINIVIL) 10 MG Tab TAKE ONE TABLET BY MOUTH ONE TIME DAILY  90 Tab 1   • metformin (GLUCOPHAGE) 1000 MG tablet TAKE ONE TABLET BY MOUTH TWICE DAILY WITH FOOD  180 Tab 3   • JANUVIA 50 MG Tab TAKE ONE TABLET BY MOUTH ONE TIME DAILY  30 Tab 6   • glucose blood (ACCU-CHEK SMARTVIEW) strip 1 Strip by Other route every day. 50 Strip 6   • Blood Glucose Monitoring Suppl (ACCU-CHEK ADVANTAGE DIABETES) KIT Test blood sugar every morning. 1 Kit 0   • Lancet Device MISC Test blood sugar every morning. 100 Each 4     No current facility-administered medications for this visit.      She  has a past medical history of Diabetes (HCC), Hyperplastic colon polyp (8/30/13), Hypertension,  "Hypertriglyceridemia (5/9/2013), Osteopenia, Psoriasis, and Psoriatic arthritis (HCC). She also has no past medical history of Cancer (HCC).    ROS as stated in hpi  No chest pain, no shortness of breath, no abdominal pain       Objective:     /82 (BP Location: Left arm, Patient Position: Sitting)   Pulse 88   Temp 36.4 °C (97.5 °F) (Temporal)   Resp 14   Ht 1.448 m (4' 9\")   Wt 77.1 kg (170 lb)   SpO2 96%  Body mass index is 36.79 kg/m². stable.   Physical Exam:  Constitutional: Alert, no distress.  Skin: Warm, dry, good turgor,no cyanosis, no rashes in visible areas.  Eye: Equal, round and reactive, conjunctiva clear, lids normal.  Ears: No tenderness, no discharge.  External canals are without any significant edema or erythema.  Tympanic membranes are without any inflammation, no effusion.  Gross auditory acuity is intact.  Nose: symmetrical without tenderness, no discharge.  Mouth/Throat: lips without lesion.  Oropharynx clear.  Throat without erythema, exudates or tonsillar enlargement.  Neck: Trachea midline, no masses, no obvious thyroid enlargement.. No cervical or supraclavicular lymphadenopathy. Range of motion within normal limits.  Neuro: Cranial nerves 2-12 grossly intact.  No sensory deficit.  Respiratory: Unlabored respiratory effort, lungs clear to auscultation, no wheezes, no ronchi.  Cardiovascular: Normal S1, S2, no murmur, no edema.  Abdomen: Soft, non-tender, no masses, no guarding,  no hepatosplenomegaly.  Psych: Alert and oriented x3, normal affect and mood and judgement.        Assessment and Plan:   The following treatment plan was discussed    1. Need for vaccination  Due for vaccine.  No acute illness.  I have placed the below orders and discussed them with an approved delegating provider.  The MA is performing the below orders under the direction of Dr. Justyn MD.    - Influenza Vaccine Quad Injection (PF)    2. Uncontrolled type 2 diabetes mellitus with hyperglycemia " (HCC)  Chronic, ongoing, controlled and improving.  A1c 6.1.  Continue with metformin and januvia.  Continue daily walking.  Continue dietary controls.     3. Essential hypertension  Chronic, ongoing. Stable. At goal.  Continue lisinopril.     4. Hypertriglyceridemia  Chronic, ongoing, worsening.  Will change to rosuvastatin to see if we get better triglyceride control. Recheck in 6 months.  Monitor and follow.   - Lipid Profile; Future    5. Psoriatic arthritis (HCC)  Chronic, ongoing. Stable.  Followed by Dr. Calvillo yearly.  No flare of symptoms reported.     6. Need for hepatitis C screening test  Due for screening.  Labs ordered.  Monitor and follow.   - HEP C VIRUS ANTIBODY; Future    7. Need for hepatitis B screening test  Due for screening as sister has hep b and patient grew up in [facility].  Monitor and follow.   - HEPATITIS B SURFACE ANTIGEN; Future    8. Screen for colon cancer  Due for screening.  Referral placed.  Monitor   - REFERRAL TO GASTROENTEROLOGY      Followup: Return in about 6 months (around 5/12/2020).         Educated in proper administration of medication(s) ordered today including safety, possible SE, risks, benefits, rationale and alternatives to therapy.     Please note that this dictation was created using voice recognition software. I have made every reasonable attempt to correct obvious errors, but I expect that there are errors of grammar and possibly content that I did not discover before finalizing the note.

## 2019-11-12 NOTE — ASSESSMENT & PLAN NOTE
Results for TRANG VARELA (MRN [id number]) as of 11/12/2019 08:39   Ref. Range 11/4/2019 07:29   A-G Ratio Latest Units: g/dL 1.5   Glycohemoglobin Latest Ref Range: 0.0 - 5.6 % 6.1 (H)   Estim. Avg Glu Latest Units: mg/dL 128   Cholesterol,Tot Latest Ref Range: 100 - 199 mg/dL 128   Triglycerides Latest Ref Range: 0 - 149 mg/dL 230 (H)   HDL Latest Ref Range: >=40 mg/dL 45   LDL Latest Ref Range: <100 mg/dL 37   Taking metformin and januvia daily

## 2019-11-26 ENCOUNTER — PATIENT MESSAGE (OUTPATIENT)
Dept: MEDICAL GROUP | Facility: PHYSICIAN GROUP | Age: 64
End: 2019-11-26

## 2019-11-26 DIAGNOSIS — R76.8 HEPATITIS B CORE ANTIBODY POSITIVE: ICD-10-CM

## 2019-11-26 NOTE — TELEPHONE ENCOUNTER
From: Kristie Farley  To: ELMIRA Haywood  Sent: 11/26/2019 1:30 PM PST  Subject: Non-Urgent Medical Question    Wow, we did not know that at all. She did have a colonoscopy, but not once do we recall that she could or does have the Hep B or Hep C from the Dr. If we could retest then we could go from there, how long could she have had this? My mom has had 2 partners in her life (I believe, and will have that awkward conversation again). The first was with my brothers father when she was 18, then a few years later my father, and they have been  since, for the past 45 years. Could it go back as far is that? Maybe I am getting ahead of myself....Let's start with Labs. I will have my mom to take the labs tomorrow, if that is OK?Thank you so much!  ----- Message -----  From: ELMIRA Haywood  Sent: 11/26/2019 11:24 AM PST  To: Kristie Farley  Subject: RE: Non-Urgent Medical Question  Anahi  In 2016, the Hep B core antibody was positive in a blood draw for your mom. I was not her provider at that time, so I am not sure what follow up there was to this. Did she see a GI doctor? Has she had the Hep B vaccine in the past? She has not been tested for the Hep C virusl. We could retest for both and move forward from there. Hepatitis B and C are transmitted via blood or sexual transmission.   I will place the orders for lab in the computer. They are non-fasting.  ELMIRA Haywood      ----- Message -----   From: Kristie Miguelito   Sent: 11/26/2019 9:22 AM PST   To: ELMIRA Haywood  Subject: Non-Urgent Medical Question    Good morning , I (Anahi Carson, Kristie's daughter) set up her [employer] account.    She/We would like know about the Hep C and B info you talked to her about. She stated that she thinks she has Hep C. Since it is a blood borne disease (i think?) We are curious how she would contract this and what we need to do in order to assist with her health, we are all very concerned.    Thanks so  rossy Farley [facility]  [phone]

## 2019-12-02 ENCOUNTER — HOSPITAL ENCOUNTER (OUTPATIENT)
Dept: LAB | Facility: MEDICAL CENTER | Age: 64
End: 2019-12-02
Attending: NURSE PRACTITIONER
Payer: COMMERCIAL

## 2019-12-02 DIAGNOSIS — R76.8 HEPATITIS B CORE ANTIBODY POSITIVE: ICD-10-CM

## 2019-12-02 PROCEDURE — 36415 COLL VENOUS BLD VENIPUNCTURE: CPT

## 2019-12-02 PROCEDURE — 80074 ACUTE HEPATITIS PANEL: CPT

## 2019-12-03 ENCOUNTER — PATIENT MESSAGE (OUTPATIENT)
Dept: MEDICAL GROUP | Facility: PHYSICIAN GROUP | Age: 64
End: 2019-12-03

## 2019-12-03 LAB
HAV IGM SERPL QL IA: NEGATIVE
HBV CORE IGM SER QL: NEGATIVE
HBV SURFACE AG SER QL: NEGATIVE
HCV AB SER QL: NEGATIVE

## 2019-12-03 NOTE — TELEPHONE ENCOUNTER
From: Kristie Farley  To: ELMIRA Haywood  Sent: 12/3/2019 9:00 AM PST  Subject: Non-Urgent Medical Question    Alyson Fink  Hope you had a great holiday. Looks like the results are negative, that is great! Any reason we should be concerned with anything else, since it showed up a few years ago? Thank you so much!    You also see my father Danish, I will be creating a login there and hopefully about to assist him.    I appreciate all you do for my parents, we have met (I was w/dad).  Have a great day.  Anahi Farley  ----- Message -----  From: ELMIRA Haywood  Sent: 11/26/2019 2:37 PM PST  To: Kristie Farley  Subject: RE: Non-Urgent Medical Question  I'll be watching for the results. I am out of the office starting tomorrow, returning on Monday the 1st of December. I'll be in touch then  Enjoy your Thanksgiving  ELMIRA Haywood      ----- Message -----   From: Kristie Farley   Sent: 11/26/2019 1:30 PM PST   To: ELMIRA Haywood  Subject: Non-Urgent Medical Question    Wow, we did not know that at all. She did have a colonoscopy, but not once do we recall that she could or does have the Hep B or Hep C from the Dr. If we could retest then we could go from there, how long could she have had this? My mom has had 2 partners in her life (I believe, and will have that awkward conversation again). The first was with my brothers father when she was 18, then a few years later my father, and they have been  since, for the past 45 years. Could it go back as far is that? Maybe I am getting ahead of myself....Let's start with Labs. I will have my mom to take the labs tomorrow, if that is OK?Thank you so much!  ----- Message -----  From: ELMIRA Haywood  Sent: 11/26/2019 11:24 AM PST  To: Kristie Farley  Subject: RE: Non-Urgent Medical Question  Anahi  In 2016, the Hep B core antibody was positive in a blood draw for your mom. I was not her provider at that time, so I am not sure what  follow up there was to this. Did she see a GI doctor? Has she had the Hep B vaccine in the past? She has not been tested for the Hep C virusl. We could retest for both and move forward from there. Hepatitis B and C are transmitted via blood or sexual transmission.   I will place the orders for lab in the computer. They are non-fasting.  ELMIRA Haywood      ----- Message -----   From: Kristie Farley   Sent: 11/26/2019 9:22 AM PST   To: ELMIRA Haywood  Subject: Non-Urgent Medical Question    Good morning , I (Anahi Carson, Kristie's daughter) set up her [employer] account.    She/We would like know about the Hep C and B info you talked to her about. She stated that she thinks she has Hep C. Since it is a blood borne disease (i think?) We are curious how she would contract this and what we need to do in order to assist with her health, we are all very concerned.    Thanks so much  Kristie Farley  Anahi Carson  [phone]

## 2020-02-03 RX ORDER — SITAGLIPTIN 50 MG/1
TABLET, FILM COATED ORAL
Qty: 30 TAB | Refills: 11 | Status: SHIPPED | OUTPATIENT
Start: 2020-02-03 | End: 2021-02-16

## 2020-02-03 NOTE — TELEPHONE ENCOUNTER
Requested Prescriptions     Signed Prescriptions Disp Refills   • JANUVIA 50 MG Tab 30 Tab 11     Sig: TAKE ONE TABLET BY MOUTH ONE TIME DAILY     Authorizing Provider: AYDIN TOBIN A.P.R.N.

## 2020-02-18 NOTE — TELEPHONE ENCOUNTER
Requested Prescriptions     Signed Prescriptions Disp Refills   • glucose blood strip 50 Strip 9     Sig: USE ONE TEST STRIP TO CHECK BLOOD GLUCOSE LEVEL ONCE DAILY     Authorizing Provider: AYDIN TOBIN A.P.R.N.

## 2020-04-27 ENCOUNTER — HOSPITAL ENCOUNTER (OUTPATIENT)
Dept: LAB | Facility: MEDICAL CENTER | Age: 65
End: 2020-04-27
Attending: NURSE PRACTITIONER
Payer: COMMERCIAL

## 2020-04-27 DIAGNOSIS — Z11.59 NEED FOR HEPATITIS B SCREENING TEST: ICD-10-CM

## 2020-04-27 DIAGNOSIS — E78.1 HYPERTRIGLYCERIDEMIA: ICD-10-CM

## 2020-04-27 DIAGNOSIS — Z11.59 NEED FOR HEPATITIS C SCREENING TEST: ICD-10-CM

## 2020-04-27 LAB
CHOLEST SERPL-MCNC: 105 MG/DL (ref 100–199)
FASTING STATUS PATIENT QL REPORTED: NORMAL
HCV AB SER QL: NORMAL
HDLC SERPL-MCNC: 44 MG/DL
LDLC SERPL CALC-MCNC: 19 MG/DL
TRIGL SERPL-MCNC: 210 MG/DL (ref 0–149)

## 2020-04-27 PROCEDURE — 80061 LIPID PANEL: CPT

## 2020-04-27 PROCEDURE — 86803 HEPATITIS C AB TEST: CPT

## 2020-04-27 PROCEDURE — 36415 COLL VENOUS BLD VENIPUNCTURE: CPT

## 2020-04-29 DIAGNOSIS — I10 ESSENTIAL HYPERTENSION: ICD-10-CM

## 2020-04-29 RX ORDER — LISINOPRIL 10 MG/1
TABLET ORAL
Qty: 90 TAB | Refills: 2 | Status: SHIPPED | OUTPATIENT
Start: 2020-04-29 | End: 2021-01-25

## 2020-04-29 NOTE — TELEPHONE ENCOUNTER
Received request via: Pharmacy    Was the patient seen in the last year in this department? Yes lov 11/12/19    Does the patient have an active prescription (recently filled or refills available) for medication(s) requested? No

## 2020-05-12 ENCOUNTER — OFFICE VISIT (OUTPATIENT)
Dept: MEDICAL GROUP | Facility: PHYSICIAN GROUP | Age: 65
End: 2020-05-12
Payer: COMMERCIAL

## 2020-05-12 VITALS
RESPIRATION RATE: 16 BRPM | HEIGHT: 57 IN | OXYGEN SATURATION: 96 % | HEART RATE: 80 BPM | DIASTOLIC BLOOD PRESSURE: 80 MMHG | BODY MASS INDEX: 36.89 KG/M2 | WEIGHT: 171 LBS | TEMPERATURE: 96.3 F | SYSTOLIC BLOOD PRESSURE: 140 MMHG

## 2020-05-12 DIAGNOSIS — I10 ESSENTIAL HYPERTENSION: ICD-10-CM

## 2020-05-12 DIAGNOSIS — E78.1 HYPERTRIGLYCERIDEMIA: ICD-10-CM

## 2020-05-12 DIAGNOSIS — E11.65 UNCONTROLLED TYPE 2 DIABETES MELLITUS WITH HYPERGLYCEMIA (HCC): ICD-10-CM

## 2020-05-12 DIAGNOSIS — L40.50 PSORIATIC ARTHRITIS (HCC): ICD-10-CM

## 2020-05-12 PROCEDURE — 99214 OFFICE O/P EST MOD 30 MIN: CPT | Performed by: NURSE PRACTITIONER

## 2020-05-12 ASSESSMENT — FIBROSIS 4 INDEX: FIB4 SCORE: 1.68

## 2020-05-12 ASSESSMENT — PATIENT HEALTH QUESTIONNAIRE - PHQ9: CLINICAL INTERPRETATION OF PHQ2 SCORE: 0

## 2020-05-12 NOTE — ASSESSMENT & PLAN NOTE
Has upcoming appointment with Dr. Calvillo in July.  No current outbreaks.  Taking enbrel every two weeks.

## 2020-05-12 NOTE — ASSESSMENT & PLAN NOTE
Results for TRANG VARELA (MRN [id number]) as of 5/12/2020 08:43   Ref. Range 4/27/2020 07:15   Cholesterol,Tot Latest Ref Range: 100 - 199 mg/dL 105   Triglycerides Latest Ref Range: 0 - 149 mg/dL 210 (H)   HDL Latest Ref Range: >=40 mg/dL 44   LDL Latest Ref Range: <100 mg/dL 19   Down from 230.  No alcohol, watching sugars.  Walking daily

## 2020-05-12 NOTE — PROGRESS NOTES
Chief Complaint   Patient presents with   • Follow-Up   • Lab Results       Subjective:   Kristie Farley is a 64 y.o. female here today for evaluation and management of:    DM (diabetes mellitus), type 2, uncontrolled  Feeling good.  Walking daily. Taking januvia and metformin daily.     Essential hypertension  bp today is 140/80 taking lisinopril, but has not taken yet this morning.     Hypertriglyceridemia  Results for KRISTIE FARLEY (MRN [id number]) as of 5/12/2020 08:43   Ref. Range 4/27/2020 07:15   Cholesterol,Tot Latest Ref Range: 100 - 199 mg/dL 105   Triglycerides Latest Ref Range: 0 - 149 mg/dL 210 (H)   HDL Latest Ref Range: >=40 mg/dL 44   LDL Latest Ref Range: <100 mg/dL 19   Down from 230.  No alcohol, watching sugars.  Walking daily    Psoriatic arthritis  Has upcoming appointment with Dr. Calvillo in July.  No current outbreaks.  Taking enbrel every two weeks.          Current medicines (including changes today)  Current Outpatient Medications   Medication Sig Dispense Refill   • lisinopril (PRINIVIL) 10 MG Tab TAKE ONE TABLET BY MOUTH ONE TIME DAILY  90 Tab 2   • JANUVIA 50 MG Tab TAKE ONE TABLET BY MOUTH ONE TIME DAILY  30 Tab 11   • rosuvastatin (CRESTOR) 20 MG Tab Take 1 Tab by mouth every evening. 30 Tab 11   • metformin (GLUCOPHAGE) 1000 MG tablet TAKE ONE TABLET BY MOUTH TWICE DAILY WITH FOOD  180 Tab 3   • glucose blood strip USE ONE TEST STRIP TO CHECK BLOOD GLUCOSE LEVEL ONCE DAILY 50 Strip 9   • Blood Glucose Monitoring Suppl (ACCU-CHEK ADVANTAGE DIABETES) KIT Test blood sugar every morning. 1 Kit 0   • Lancet Device MISC Test blood sugar every morning. 100 Each 4     No current facility-administered medications for this visit.      She  has a past medical history of Diabetes (HCC), Hyperplastic colon polyp (8/30/13), Hypertension, Hypertriglyceridemia (5/9/2013), Osteopenia, Psoriasis, and Psoriatic arthritis (HCC). She also has no past medical history of Cancer (HCC).    ROS as stated in hpi  No  "chest pain, no shortness of breath, no abdominal pain       Objective:     /80 (BP Location: Left arm, Patient Position: Sitting)   Pulse 80   Temp (!) 35.7 °C (96.3 °F) (Temporal)   Resp 16   Ht 1.448 m (4' 9\")   Wt 77.6 kg (171 lb)   SpO2 96%  Body mass index is 37 kg/m². stable  Physical Exam:  Constitutional: Alert, no distress.  Skin: Warm, dry, good turgor,no cyanosis, no rashes in visible areas.  Eye: Equal, round and reactive, conjunctiva clear, lids normal.  Ears: No tenderness, no discharge.  External canals are without any significant edema or erythema.   Gross auditory acuity is intact.  Nose: symmetrical without tenderness, no discharge.  Mouth/Throat: lips without lesion.  Oropharynx clear.    Neck: Trachea midline, no masses, no obvious thyroid enlargement..  Range of motion within normal limits.  Neuro: Cranial nerves 2-12 grossly intact.  No sensory deficit.  Respiratory: Unlabored respiratory effort, lungs clear to auscultation, no wheezes, no ronchi.  Cardiovascular: Normal S1, S2, no murmur, no edema.  Psych: Alert and oriented x3, normal affect and mood and judgement.        Assessment and Plan:   The following treatment plan was discussed    1. Uncontrolled type 2 diabetes mellitus with hyperglycemia (HCC)  Chronic, ongoing, doing well.  Taking januvia and metformin.  Walking dog daily.  Will be due for A1c at next visit.  Continue current plan.  Continue to monitor white rice intake.      2. Essential hypertension  Chronic, ongoing. Controlled.  Continue lisinopril and walking.  Monitor and follow.     3. Hypertriglyceridemia  Chronic, ongoing. Improving.  Continue to watch rice portions, daily walking and weight control.  Does not drink alcohol.     4. Psoriatic arthritis (HCC)  Chornic, ongoing. Stable.  Followed by Dr. Calvillo.  [place]ores 2Xmonthly.  Next appointment July      Followup: Return in about 6 months (around 11/12/2020) for a1c screen.         Educated in proper " administration of medication(s) ordered today including safety, possible SE, risks, benefits, rationale and alternatives to therapy.     Please note that this dictation was created using voice recognition software. I have made every reasonable attempt to correct obvious errors, but I expect that there are errors of grammar and possibly content that I did not discover before finalizing the note.

## 2020-06-27 DIAGNOSIS — E11.8 TYPE 2 DIABETES MELLITUS WITH COMPLICATION (HCC): ICD-10-CM

## 2020-07-16 ENCOUNTER — HOSPITAL ENCOUNTER (OUTPATIENT)
Dept: LAB | Facility: MEDICAL CENTER | Age: 65
End: 2020-07-16
Attending: SPECIALIST
Payer: COMMERCIAL

## 2020-07-16 LAB
ALBUMIN SERPL BCP-MCNC: 4.5 G/DL (ref 3.2–4.9)
ALBUMIN/GLOB SERPL: 1.7 G/DL
ALP SERPL-CCNC: 50 U/L (ref 30–99)
ALT SERPL-CCNC: 10 U/L (ref 2–50)
ANION GAP SERPL CALC-SCNC: 13 MMOL/L (ref 7–16)
AST SERPL-CCNC: 20 U/L (ref 12–45)
BASOPHILS # BLD AUTO: 0.7 % (ref 0–1.8)
BASOPHILS # BLD: 0.05 K/UL (ref 0–0.12)
BILIRUB SERPL-MCNC: 0.3 MG/DL (ref 0.1–1.5)
BUN SERPL-MCNC: 12 MG/DL (ref 8–22)
CALCIUM SERPL-MCNC: 9.1 MG/DL (ref 8.5–10.5)
CHLORIDE SERPL-SCNC: 102 MMOL/L (ref 96–112)
CO2 SERPL-SCNC: 23 MMOL/L (ref 20–33)
CREAT SERPL-MCNC: 0.66 MG/DL (ref 0.5–1.4)
EOSINOPHIL # BLD AUTO: 0.09 K/UL (ref 0–0.51)
EOSINOPHIL NFR BLD: 1.3 % (ref 0–6.9)
ERYTHROCYTE [DISTWIDTH] IN BLOOD BY AUTOMATED COUNT: 35.9 FL (ref 35.9–50)
ERYTHROCYTE [SEDIMENTATION RATE] IN BLOOD BY WESTERGREN METHOD: 1 MM/HOUR (ref 0–30)
GLOBULIN SER CALC-MCNC: 2.6 G/DL (ref 1.9–3.5)
GLUCOSE SERPL-MCNC: 225 MG/DL (ref 65–99)
HCT VFR BLD AUTO: 38.8 % (ref 37–47)
HGB BLD-MCNC: 12.6 G/DL (ref 12–16)
IMM GRANULOCYTES # BLD AUTO: 0.02 K/UL (ref 0–0.11)
IMM GRANULOCYTES NFR BLD AUTO: 0.3 % (ref 0–0.9)
LYMPHOCYTES # BLD AUTO: 3.22 K/UL (ref 1–4.8)
LYMPHOCYTES NFR BLD: 47.9 % (ref 22–41)
MCH RBC QN AUTO: 22.1 PG (ref 27–33)
MCHC RBC AUTO-ENTMCNC: 32.5 G/DL (ref 33.6–35)
MCV RBC AUTO: 68.1 FL (ref 81.4–97.8)
MONOCYTES # BLD AUTO: 0.34 K/UL (ref 0–0.85)
MONOCYTES NFR BLD AUTO: 5.1 % (ref 0–13.4)
NEUTROPHILS # BLD AUTO: 3 K/UL (ref 2–7.15)
NEUTROPHILS NFR BLD: 44.7 % (ref 44–72)
NRBC # BLD AUTO: 0.02 K/UL
NRBC BLD-RTO: 0.3 /100 WBC
PLATELET # BLD AUTO: 219 K/UL (ref 164–446)
PMV BLD AUTO: 10.5 FL (ref 9–12.9)
POTASSIUM SERPL-SCNC: 4.1 MMOL/L (ref 3.6–5.5)
PROT SERPL-MCNC: 7.1 G/DL (ref 6–8.2)
RBC # BLD AUTO: 5.7 M/UL (ref 4.2–5.4)
SODIUM SERPL-SCNC: 138 MMOL/L (ref 135–145)
WBC # BLD AUTO: 6.7 K/UL (ref 4.8–10.8)

## 2020-07-16 PROCEDURE — 36415 COLL VENOUS BLD VENIPUNCTURE: CPT

## 2020-07-16 PROCEDURE — 85025 COMPLETE CBC W/AUTO DIFF WBC: CPT

## 2020-07-16 PROCEDURE — 80053 COMPREHEN METABOLIC PANEL: CPT

## 2020-07-16 PROCEDURE — 85652 RBC SED RATE AUTOMATED: CPT

## 2020-11-17 ENCOUNTER — OFFICE VISIT (OUTPATIENT)
Dept: MEDICAL GROUP | Facility: PHYSICIAN GROUP | Age: 65
End: 2020-11-17
Payer: MEDICARE

## 2020-11-17 VITALS
WEIGHT: 168 LBS | BODY MASS INDEX: 36.24 KG/M2 | OXYGEN SATURATION: 98 % | DIASTOLIC BLOOD PRESSURE: 70 MMHG | RESPIRATION RATE: 16 BRPM | SYSTOLIC BLOOD PRESSURE: 126 MMHG | TEMPERATURE: 96.3 F | HEIGHT: 57 IN | HEART RATE: 80 BPM

## 2020-11-17 DIAGNOSIS — Z78.0 POSTMENOPAUSAL: ICD-10-CM

## 2020-11-17 DIAGNOSIS — E55.9 VITAMIN D DEFICIENCY: ICD-10-CM

## 2020-11-17 DIAGNOSIS — E11.65 UNCONTROLLED TYPE 2 DIABETES MELLITUS WITH HYPERGLYCEMIA (HCC): ICD-10-CM

## 2020-11-17 DIAGNOSIS — Z23 NEED FOR VACCINATION: ICD-10-CM

## 2020-11-17 DIAGNOSIS — Z12.31 ENCOUNTER FOR SCREENING MAMMOGRAM FOR MALIGNANT NEOPLASM OF BREAST: ICD-10-CM

## 2020-11-17 DIAGNOSIS — L40.50 PSORIATIC ARTHRITIS (HCC): ICD-10-CM

## 2020-11-17 DIAGNOSIS — E78.1 HYPERTRIGLYCERIDEMIA: ICD-10-CM

## 2020-11-17 DIAGNOSIS — I10 ESSENTIAL HYPERTENSION: ICD-10-CM

## 2020-11-17 LAB
HBA1C MFR BLD: 6.8 % (ref 0–5.6)
INT CON NEG: NEGATIVE
INT CON POS: POSITIVE

## 2020-11-17 PROCEDURE — 83036 HEMOGLOBIN GLYCOSYLATED A1C: CPT | Performed by: NURSE PRACTITIONER

## 2020-11-17 PROCEDURE — 90732 PPSV23 VACC 2 YRS+ SUBQ/IM: CPT | Performed by: NURSE PRACTITIONER

## 2020-11-17 PROCEDURE — G0008 ADMIN INFLUENZA VIRUS VAC: HCPCS | Performed by: NURSE PRACTITIONER

## 2020-11-17 PROCEDURE — 99214 OFFICE O/P EST MOD 30 MIN: CPT | Mod: 25 | Performed by: NURSE PRACTITIONER

## 2020-11-17 PROCEDURE — 90662 IIV NO PRSV INCREASED AG IM: CPT | Performed by: NURSE PRACTITIONER

## 2020-11-17 PROCEDURE — G0009 ADMIN PNEUMOCOCCAL VACCINE: HCPCS | Performed by: NURSE PRACTITIONER

## 2020-11-17 ASSESSMENT — FIBROSIS 4 INDEX: FIB4 SCORE: 1.88

## 2020-11-17 NOTE — ASSESSMENT & PLAN NOTE
6.8 a1c today. Up from previous A1c of 6.1. Taking januvia and metformin daily. Discussed healthy dietary choices including sugar free creamer and decreasing juice intake. Continues to walk daily.

## 2020-11-17 NOTE — PROGRESS NOTES
Chief Complaint   Patient presents with   • Follow-Up   • Diabetes   • Hypertension       Subjective:   Kristie Farley is a 65 y.o. female here today for evaluation and management of:    DM (diabetes mellitus), type 2, uncontrolled  6.8 a1c today. Up from previous A1c of 6.1. Taking januvia and metformin daily. Discussed healthy dietary choices including sugar free creamer and decreasing juice intake. Continues to walk daily.     Essential hypertension  bp at goal.  126/70. Taking lisinopril. Denies headaches, dizziness, chest pain, and dizziness.     Colon polyp  Had colonoscopy scheduled in April that was cancelled. Encouraged her to call and reschedule.     Hypertriglyceridemia  Taking rosuvastatin daily. Walking daily. Continue healthy diet.     Osteopenia  Encouraged calcium and vitamin D. Due for bone density. Walking daily.     Psoriatic arthritis  Followed by Dr. Calvillo. Doing well with embrel every 2 weeks.        Current medicines (including changes today)  Current Outpatient Medications   Medication Sig Dispense Refill   • metformin (GLUCOPHAGE) 1000 MG tablet TAKE ONE TABLET BY MOUTH TWICE DAILY WITH FOOD 180 Tab 1   • lisinopril (PRINIVIL) 10 MG Tab TAKE ONE TABLET BY MOUTH ONE TIME DAILY  90 Tab 2   • glucose blood strip USE ONE TEST STRIP TO CHECK BLOOD GLUCOSE LEVEL ONCE DAILY 50 Strip 9   • JANUVIA 50 MG Tab TAKE ONE TABLET BY MOUTH ONE TIME DAILY  30 Tab 11   • rosuvastatin (CRESTOR) 20 MG Tab Take 1 Tab by mouth every evening. 30 Tab 11   • Blood Glucose Monitoring Suppl (ACCU-CHEK ADVANTAGE DIABETES) KIT Test blood sugar every morning. 1 Kit 0   • Lancet Device MISC Test blood sugar every morning. 100 Each 4     No current facility-administered medications for this visit.      She  has a past medical history of Diabetes ([facility]), Hyperplastic colon polyp (8/30/13), Hypertension, Hypertriglyceridemia (5/9/2013), Osteopenia, Psoriasis, and Psoriatic arthritis ([facility]). She also has no past medical history of  "Cancer (HCC).    ROS As stated in HPI  No chest pain, no shortness of breath, no abdominal pain       Objective:     /70 (BP Location: Left arm, Patient Position: Sitting)   Pulse 80   Temp (!) 35.7 °C (96.3 °F) (Temporal)   Resp 16   Ht 1.448 m (4' 9\")   Wt 76.2 kg (168 lb)   SpO2 98%  Body mass index is 36.35 kg/m².   Physical Exam:  Constitutional: Alert, no distress.  Skin: Warm, dry, good turgor,no cyanosis, no rashes in visible areas.  Eye: Equal, round and reactive, conjunctiva clear, lids normal.  Ears: No tenderness, no discharge.  External canals are without any significant edema or erythema. Gross auditory acuity is intact.  Nose: symmetrical without tenderness, no discharge.  Mouth/Throat: lips without lesion.    Neck: Trachea midline, no masses, no obvious thyroid enlargement.. No cervical or supraclavicular lymphadenopathy. Range of motion within normal limits.  Neuro: Cranial nerves 2-12 grossly intact.  No sensory deficit.  Respiratory: Unlabored respiratory effort, lungs clear to auscultation, no wheezes, no ronchi.  Cardiovascular: Normal S1, S2, no murmur, no edema.  Psych: Alert and oriented x3, normal affect and mood and judgement.  Monofilament testing with a 10 gram force: sensation intact: intact bilaterally  Visual Inspection: Feet without maceration, ulcers, fissures.  Pedal pulses: intact bilaterally          Assessment and Plan:   The following treatment plan was discussed    1. Uncontrolled type 2 diabetes mellitus with hyperglycemia (HCC)  Chronic, ongoing. Continue with metformin and januvia. Continue daily walking. Discussed healthy dietary changes. Labs due in 6 months for follow up.   - POCT  A1C  - HEMOGLOBIN A1C; Future  - MICROALBUMIN CREAT RATIO URINE; Future  - Lipid Profile; Future  - Diabetic Monofilament LE Exam    2. Need for vaccination  I have placed the below orders and discussed them with an approved delegating provider.  The MA is performing the below " orders under the direction of Dr. Justyn GARCIA.    - INFLUENZA VACCINE, HIGH DOSE (65+ ONLY)  - Pneumococal Polysaccharide Vaccine 23-Valent =>1YO SQ/IM    3. Encounter for screening mammogram for malignant neoplasm of breast  Due for screening.   - MA-SCREENING MAMMO BILAT W/CAD; Future    4. Postmenopausal  Due for screening. Encouraged calcium and vitamin D supplementation. Continue daily walking.   - DS-BONE DENSITY STUDY (DEXA); Future    5. Essential hypertension  Chronic, ongoing. Continue lisinopril. Due for labs.   - CBC WITH DIFFERENTIAL; Future  - Comp Metabolic Panel; Future    6. Vitamin D deficiency  Chronic, ongoing. Due for labs. Encouraged supplementation.   - VITAMIN D,25 HYDROXY; Future    7. Hypertriglyceridemia  Chronic, ongoing. Continue rosuvastatin, daily walking, and healthy diet.     8. Psoriatic arthritis (HCC)  Chronic, ongoing, stable. Followed by Dr. Calvillo.      Followup: Return in about 6 months (around 5/17/2021) for Diabetes.         Educated in proper administration of medication(s) ordered today including safety, possible SE, risks, benefits, rationale and alternatives to therapy.     Please note that this dictation was created using voice recognition software. I have made every reasonable attempt to correct obvious errors, but I expect that there are errors of grammar and possibly content that I did not discover before finalizing the note.

## 2020-12-14 RX ORDER — ROSUVASTATIN CALCIUM 20 MG/1
TABLET, COATED ORAL
Qty: 90 TAB | Refills: 3 | Status: SHIPPED | OUTPATIENT
Start: 2020-12-14 | End: 2021-11-15 | Stop reason: SDUPTHER

## 2020-12-14 NOTE — TELEPHONE ENCOUNTER
Requested Prescriptions     Signed Prescriptions Disp Refills   • rosuvastatin (CRESTOR) 20 MG Tab 90 Tab 3     Sig: TAKE ONE TABLET BY MOUTH IN THE EVENING     Authorizing Provider: AYDIN TOBIN A.P.R.N.

## 2021-01-23 DIAGNOSIS — I10 ESSENTIAL HYPERTENSION: ICD-10-CM

## 2021-01-25 RX ORDER — LISINOPRIL 10 MG/1
TABLET ORAL
Qty: 90 TAB | Refills: 3 | Status: SHIPPED | OUTPATIENT
Start: 2021-01-25 | End: 2021-11-15 | Stop reason: SDUPTHER

## 2021-01-25 NOTE — TELEPHONE ENCOUNTER
Requested Prescriptions     Signed Prescriptions Disp Refills   • lisinopril (PRINIVIL) 10 MG Tab 90 Tab 3     Sig: TAKE ONE TABLET BY MOUTH ONE TIME DAILY     Authorizing Provider: AYDIN TOBIN A.P.R.N.

## 2021-02-10 DIAGNOSIS — E11.8 TYPE 2 DIABETES MELLITUS WITH COMPLICATION (HCC): ICD-10-CM

## 2021-02-10 NOTE — TELEPHONE ENCOUNTER
Requested Prescriptions     Signed Prescriptions Disp Refills   • metformin (GLUCOPHAGE) 1000 MG tablet 180 tablet 2     Sig: TAKE ONE TABLET BY MOUTH TWICE DAILY WITH FOOD     Authorizing Provider: AYDIN TOBIN A.P.R.N.

## 2021-02-16 RX ORDER — SITAGLIPTIN 50 MG/1
TABLET, FILM COATED ORAL
Qty: 30 TABLET | Refills: 11 | Status: SHIPPED | OUTPATIENT
Start: 2021-02-16 | End: 2022-02-18 | Stop reason: SDUPTHER

## 2021-02-16 NOTE — TELEPHONE ENCOUNTER
Requested Prescriptions     Signed Prescriptions Disp Refills   • JANUVIA 50 MG Tab 30 tablet 11     Sig: TAKE ONE TABLET BY MOUTH ONE TIME DAILY     Authorizing Provider: AYDIN TOBIN A.P.R.N.

## 2021-02-22 RX ORDER — BLOOD SUGAR DIAGNOSTIC
STRIP MISCELLANEOUS
Qty: 50 STRIP | Refills: 6 | Status: SHIPPED | OUTPATIENT
Start: 2021-02-22 | End: 2021-11-23

## 2021-02-22 NOTE — TELEPHONE ENCOUNTER
Requested Prescriptions     Signed Prescriptions Disp Refills   • ACCU-CHEK SMARTVIEW strip 50 Strip 6     Sig: USE TO TEST BLOOD GLUCOSE LEVELS ONCE DAILY     Authorizing Provider: AYDIN TOBIN A.P.R.N.

## 2021-03-03 DIAGNOSIS — Z23 NEED FOR VACCINATION: ICD-10-CM

## 2021-03-24 ENCOUNTER — IMMUNIZATION (OUTPATIENT)
Dept: FAMILY PLANNING/WOMEN'S HEALTH CLINIC | Facility: IMMUNIZATION CENTER | Age: 66
End: 2021-03-24
Attending: INTERNAL MEDICINE
Payer: MEDICARE

## 2021-03-24 DIAGNOSIS — Z23 NEED FOR VACCINATION: ICD-10-CM

## 2021-03-24 DIAGNOSIS — Z23 ENCOUNTER FOR VACCINATION: Primary | ICD-10-CM

## 2021-03-24 PROCEDURE — 91300 PFIZER SARS-COV-2 VACCINE: CPT

## 2021-03-24 PROCEDURE — 0001A PFIZER SARS-COV-2 VACCINE: CPT

## 2021-04-16 ENCOUNTER — IMMUNIZATION (OUTPATIENT)
Dept: FAMILY PLANNING/WOMEN'S HEALTH CLINIC | Facility: IMMUNIZATION CENTER | Age: 66
End: 2021-04-16
Attending: INTERNAL MEDICINE
Payer: MEDICARE

## 2021-04-16 DIAGNOSIS — Z23 ENCOUNTER FOR VACCINATION: Primary | ICD-10-CM

## 2021-04-16 PROCEDURE — 0002A PFIZER SARS-COV-2 VACCINE: CPT

## 2021-04-16 PROCEDURE — 91300 PFIZER SARS-COV-2 VACCINE: CPT

## 2021-04-19 ENCOUNTER — OFFICE VISIT (OUTPATIENT)
Dept: MEDICAL GROUP | Facility: PHYSICIAN GROUP | Age: 66
End: 2021-04-19
Payer: MEDICARE

## 2021-04-19 VITALS
TEMPERATURE: 97.5 F | DIASTOLIC BLOOD PRESSURE: 80 MMHG | HEIGHT: 58 IN | RESPIRATION RATE: 16 BRPM | HEART RATE: 82 BPM | BODY MASS INDEX: 34.22 KG/M2 | SYSTOLIC BLOOD PRESSURE: 110 MMHG | OXYGEN SATURATION: 98 % | WEIGHT: 163 LBS

## 2021-04-19 DIAGNOSIS — E66.9 OBESITY (BMI 35.0-39.9 WITHOUT COMORBIDITY): ICD-10-CM

## 2021-04-19 DIAGNOSIS — M79.674 TOE PAIN, BILATERAL: ICD-10-CM

## 2021-04-19 DIAGNOSIS — M79.675 TOE PAIN, BILATERAL: ICD-10-CM

## 2021-04-19 PROCEDURE — 99212 OFFICE O/P EST SF 10 MIN: CPT | Performed by: NURSE PRACTITIONER

## 2021-04-19 RX ORDER — MEDROXYPROGESTERONE ACETATE 150 MG/ML
INJECTION, SUSPENSION INTRAMUSCULAR
COMMUNITY
Start: 2021-04-17

## 2021-04-19 ASSESSMENT — PATIENT HEALTH QUESTIONNAIRE - PHQ9: CLINICAL INTERPRETATION OF PHQ2 SCORE: 0

## 2021-04-19 ASSESSMENT — FIBROSIS 4 INDEX: FIB4 SCORE: 1.88

## 2021-04-19 NOTE — PROGRESS NOTES
"Chief Complaint   Patient presents with   • Foot Problem     right big toes       Subjective:   Kristie Farley is a 65 y.o. female here today for evaluation and management of:    Toe pain, bilateral  Reports that her toes have been hurting 3 weeks.  Bilateral great toes.  Small bloot blisters noted.  New walking shoes reported recently.  No joint swelling noted, no redness, tenderness.      A1c 6.8 in July.  Walking daily    Current medicines (including changes today)  Current Outpatient Medications   Medication Sig Dispense Refill   • ENBREL SURECLICK 50 MG/ML Solution Auto-injector      • ACCU-CHEK SMARTVIEW strip USE TO TEST BLOOD GLUCOSE LEVELS ONCE DAILY 50 Strip 6   • JANUVIA 50 MG Tab TAKE ONE TABLET BY MOUTH ONE TIME DAILY  30 tablet 11   • metformin (GLUCOPHAGE) 1000 MG tablet TAKE ONE TABLET BY MOUTH TWICE DAILY WITH FOOD  180 tablet 2   • lisinopril (PRINIVIL) 10 MG Tab TAKE ONE TABLET BY MOUTH ONE TIME DAILY  90 Tab 3   • rosuvastatin (CRESTOR) 20 MG Tab TAKE ONE TABLET BY MOUTH IN THE EVENING  90 Tab 3   • Blood Glucose Monitoring Suppl (ACCU-CHEK ADVANTAGE DIABETES) KIT Test blood sugar every morning. 1 Kit 0   • Lancet Device MISC Test blood sugar every morning. 100 Each 4     No current facility-administered medications for this visit.     She  has a past medical history of Diabetes ([facility]), Hyperplastic colon polyp (8/30/13), Hypertension, Hypertriglyceridemia (5/9/2013), Osteopenia, Psoriasis, and Psoriatic arthritis ([facility]). She also has no past medical history of Cancer ([facility]).    ROS as stated in hpi  No chest pain, no shortness of breath, no abdominal pain       Objective:     /80 (BP Location: Left arm, Patient Position: Sitting)   Pulse 82   Temp 36.4 °C (97.5 °F) (Temporal)   Resp 16   Ht 1.485 m (4' 10.47\")   Wt 73.9 kg (163 lb)   SpO2 98%  Body mass index is 33.53 kg/m².   Physical Exam:  Constitutional: Alert, no distress.  Skin: Warm, dry, good turgor,no cyanosis, no rashes in visible " areas.  Eye: Equal, round and reactive, conjunctiva clear, lids normal.  Neck: Trachea midline, no masses, no obvious thyroid enlargement.. No cervical or supraclavicular lymphadenopathy. Range of motion within normal limits.  Neuro: Cranial nerves 2-12 grossly intact.  No sensory deficit.  Respiratory: Unlabored respiratory effort, lMSK:  Bilateral great toes with small blood blisters noted on ends of toes.  Small blister seen at end of 2nd toes.  No joint swelling noted.  No open sores.  Pulses 2+ bilaterally.   Psych: Alert and oriented x3, normal affect and mood and judgement.        Assessment and Plan:   The following treatment plan was discussed    1. Toe pain, bilateral  Acute issue.  Suspect her new shoes are too snug. Advised wearing a shoe for walking that is not too short.  Diabetes is in good control.  Weight is down.  No vascular concerns at this time.  She has a regular follow up in May, we will recheck.  Monitor and follow       Followup: No follow-ups on file.         Educated in proper administration of medication(s) ordered today including safety, possible SE, risks, benefits, rationale and alternatives to therapy.     Please note that this dictation was created using voice recognition software. I have made every reasonable attempt to correct obvious errors, but I expect that there are errors of grammar and possibly content that I did not discover before finalizing the note.

## 2021-04-19 NOTE — ASSESSMENT & PLAN NOTE
Reports that her toes have been hurting 3 weeks.  Bilateral great toes.  Small bloot blisters noted.  New walking shoes reported recently.  No joint swelling noted, no redness, tenderness.

## 2021-05-10 ENCOUNTER — HOSPITAL ENCOUNTER (OUTPATIENT)
Dept: LAB | Facility: MEDICAL CENTER | Age: 66
End: 2021-05-10
Attending: NURSE PRACTITIONER
Payer: MEDICARE

## 2021-05-10 DIAGNOSIS — E11.65 UNCONTROLLED TYPE 2 DIABETES MELLITUS WITH HYPERGLYCEMIA (HCC): ICD-10-CM

## 2021-05-10 DIAGNOSIS — I10 ESSENTIAL HYPERTENSION: ICD-10-CM

## 2021-05-10 DIAGNOSIS — E55.9 VITAMIN D DEFICIENCY: ICD-10-CM

## 2021-05-10 LAB
ALBUMIN SERPL BCP-MCNC: 4.6 G/DL (ref 3.2–4.9)
ALBUMIN/GLOB SERPL: 1.5 G/DL
ALP SERPL-CCNC: 48 U/L (ref 30–99)
ALT SERPL-CCNC: 6 U/L (ref 2–50)
ANION GAP SERPL CALC-SCNC: 11 MMOL/L (ref 7–16)
AST SERPL-CCNC: 19 U/L (ref 12–45)
BASOPHILS # BLD AUTO: 0.9 % (ref 0–1.8)
BASOPHILS # BLD: 0.05 K/UL (ref 0–0.12)
BILIRUB SERPL-MCNC: 0.7 MG/DL (ref 0.1–1.5)
BUN SERPL-MCNC: 12 MG/DL (ref 8–22)
CALCIUM SERPL-MCNC: 9.7 MG/DL (ref 8.5–10.5)
CHLORIDE SERPL-SCNC: 103 MMOL/L (ref 96–112)
CHOLEST SERPL-MCNC: 99 MG/DL (ref 100–199)
CO2 SERPL-SCNC: 29 MMOL/L (ref 20–33)
CREAT SERPL-MCNC: 0.67 MG/DL (ref 0.5–1.4)
CREAT UR-MCNC: 108.34 MG/DL
EOSINOPHIL # BLD AUTO: 0.05 K/UL (ref 0–0.51)
EOSINOPHIL NFR BLD: 0.9 % (ref 0–6.9)
ERYTHROCYTE [DISTWIDTH] IN BLOOD BY AUTOMATED COUNT: 37.2 FL (ref 35.9–50)
EST. AVERAGE GLUCOSE BLD GHB EST-MCNC: 123 MG/DL
FASTING STATUS PATIENT QL REPORTED: NORMAL
GLOBULIN SER CALC-MCNC: 3.1 G/DL (ref 1.9–3.5)
GLUCOSE SERPL-MCNC: 129 MG/DL (ref 65–99)
HBA1C MFR BLD: 5.9 % (ref 4–5.6)
HCT VFR BLD AUTO: 40.3 % (ref 37–47)
HDLC SERPL-MCNC: 48 MG/DL
HGB BLD-MCNC: 13.1 G/DL (ref 12–16)
IMM GRANULOCYTES # BLD AUTO: 0.02 K/UL (ref 0–0.11)
IMM GRANULOCYTES NFR BLD AUTO: 0.4 % (ref 0–0.9)
LDLC SERPL CALC-MCNC: 27 MG/DL
LYMPHOCYTES # BLD AUTO: 1.85 K/UL (ref 1–4.8)
LYMPHOCYTES NFR BLD: 32.7 % (ref 22–41)
MCH RBC QN AUTO: 22.2 PG (ref 27–33)
MCHC RBC AUTO-ENTMCNC: 32.5 G/DL (ref 33.6–35)
MCV RBC AUTO: 68.3 FL (ref 81.4–97.8)
MICROALBUMIN UR-MCNC: <1.2 MG/DL
MICROALBUMIN/CREAT UR: NORMAL MG/G (ref 0–30)
MONOCYTES # BLD AUTO: 0.38 K/UL (ref 0–0.85)
MONOCYTES NFR BLD AUTO: 6.7 % (ref 0–13.4)
NEUTROPHILS # BLD AUTO: 3.3 K/UL (ref 2–7.15)
NEUTROPHILS NFR BLD: 58.4 % (ref 44–72)
NRBC # BLD AUTO: 0 K/UL
NRBC BLD-RTO: 0 /100 WBC
PLATELET # BLD AUTO: 231 K/UL (ref 164–446)
PMV BLD AUTO: 11.6 FL (ref 9–12.9)
POTASSIUM SERPL-SCNC: 4.5 MMOL/L (ref 3.6–5.5)
PROT SERPL-MCNC: 7.7 G/DL (ref 6–8.2)
RBC # BLD AUTO: 5.9 M/UL (ref 4.2–5.4)
SODIUM SERPL-SCNC: 143 MMOL/L (ref 135–145)
TRIGL SERPL-MCNC: 118 MG/DL (ref 0–149)
WBC # BLD AUTO: 5.7 K/UL (ref 4.8–10.8)

## 2021-05-10 PROCEDURE — 82570 ASSAY OF URINE CREATININE: CPT

## 2021-05-10 PROCEDURE — 80053 COMPREHEN METABOLIC PANEL: CPT

## 2021-05-10 PROCEDURE — 80061 LIPID PANEL: CPT

## 2021-05-10 PROCEDURE — 36415 COLL VENOUS BLD VENIPUNCTURE: CPT

## 2021-05-10 PROCEDURE — 83036 HEMOGLOBIN GLYCOSYLATED A1C: CPT | Mod: GA

## 2021-05-10 PROCEDURE — 85025 COMPLETE CBC W/AUTO DIFF WBC: CPT

## 2021-05-10 PROCEDURE — 82043 UR ALBUMIN QUANTITATIVE: CPT

## 2021-05-10 PROCEDURE — 82306 VITAMIN D 25 HYDROXY: CPT

## 2021-05-11 LAB — 25(OH)D3 SERPL-MCNC: 44 NG/ML (ref 30–80)

## 2021-05-18 ENCOUNTER — OFFICE VISIT (OUTPATIENT)
Dept: MEDICAL GROUP | Facility: PHYSICIAN GROUP | Age: 66
End: 2021-05-18
Payer: MEDICARE

## 2021-05-18 VITALS
HEIGHT: 59 IN | HEART RATE: 70 BPM | BODY MASS INDEX: 32.66 KG/M2 | DIASTOLIC BLOOD PRESSURE: 80 MMHG | RESPIRATION RATE: 16 BRPM | TEMPERATURE: 96.8 F | WEIGHT: 162 LBS | OXYGEN SATURATION: 97 % | SYSTOLIC BLOOD PRESSURE: 124 MMHG

## 2021-05-18 DIAGNOSIS — D12.6 ADENOMATOUS POLYP OF COLON, UNSPECIFIED PART OF COLON: ICD-10-CM

## 2021-05-18 DIAGNOSIS — E66.9 OBESITY (BMI 35.0-39.9 WITHOUT COMORBIDITY): ICD-10-CM

## 2021-05-18 DIAGNOSIS — E11.65 UNCONTROLLED TYPE 2 DIABETES MELLITUS WITH HYPERGLYCEMIA (HCC): ICD-10-CM

## 2021-05-18 DIAGNOSIS — L40.50 PSORIATIC ARTHRITIS (HCC): ICD-10-CM

## 2021-05-18 PROCEDURE — 99213 OFFICE O/P EST LOW 20 MIN: CPT | Performed by: NURSE PRACTITIONER

## 2021-05-18 ASSESSMENT — FIBROSIS 4 INDEX: FIB4 SCORE: 2.18

## 2021-05-18 NOTE — PROGRESS NOTES
Chief Complaint   Patient presents with   • Follow-Up   • Lab Results   • Diabetes   • Hypertension   • Obesity       Subjective:   Kristie Farley is a 65 y.o. female here today for evaluation and management of:    DM (diabetes mellitus), type 2, uncontrolled  Results for KRISTIE FARLEY (MRN [id number]) as of 5/18/2021 08:16   Ref. Range 5/10/2021 06:51   Glycohemoglobin Latest Ref Range: 4.0 - 5.6 % 5.9 (H)   Estim. Avg Glu Latest Units: mg/dL 123   Walking daily.  Weight is still coming down.  On ace and statin.  Doing well.  januvia and metformin.      Obesity (BMI 35.0-39.9 without comorbidity) ([facility])  BMI 33.28.  Down 6 pounds from last month.  Walking daily.     Psoriatic arthritis  Continues with enbrel, followed by rheumatology. Dr. Calvillo is following this.  Stable.  Skin clear at this time.     Colon polyp  Due for repeat follow up.  Cancelled during covid.  Will call and rescheduled.          Current medicines (including changes today)  Current Outpatient Medications   Medication Sig Dispense Refill   • ENBREL SURECLICK 50 MG/ML Solution Auto-injector      • ACCU-CHEK SMARTVIEW strip USE TO TEST BLOOD GLUCOSE LEVELS ONCE DAILY 50 Strip 6   • JANUVIA 50 MG Tab TAKE ONE TABLET BY MOUTH ONE TIME DAILY  30 tablet 11   • metformin (GLUCOPHAGE) 1000 MG tablet TAKE ONE TABLET BY MOUTH TWICE DAILY WITH FOOD  180 tablet 2   • lisinopril (PRINIVIL) 10 MG Tab TAKE ONE TABLET BY MOUTH ONE TIME DAILY  90 Tab 3   • rosuvastatin (CRESTOR) 20 MG Tab TAKE ONE TABLET BY MOUTH IN THE EVENING  90 Tab 3   • Blood Glucose Monitoring Suppl (ACCU-CHEK ADVANTAGE DIABETES) KIT Test blood sugar every morning. 1 Kit 0   • Lancet Device MISC Test blood sugar every morning. 100 Each 4     No current facility-administered medications for this visit.     She  has a past medical history of Diabetes ([facility]), Hyperplastic colon polyp (8/30/13), Hypertension, Hypertriglyceridemia (5/9/2013), Osteopenia, Psoriasis, and Psoriatic arthritis ([facility]). She also  "has no past medical history of Cancer (HCC).    ROS as stated in hpi  No chest pain, no shortness of breath, no abdominal pain       Objective:     /80 (BP Location: Left arm, Patient Position: Sitting)   Pulse 70   Temp 36 °C (96.8 °F) (Temporal)   Resp 16   Ht 1.486 m (4' 10.5\")   Wt 73.5 kg (162 lb)   SpO2 97%  Body mass index is 33.28 kg/m².   Physical Exam:  Constitutional: Alert, no distress.  Skin: Warm, dry, good turgor,no cyanosis, no rashes in visible areas.    Neck: Trachea midline, no masses, no obvious thyroid enlargement.. No cervical or supraclavicular lymphadenopathy. Range of motion within normal limits.  Neuro: Cranial nerves 2-12 grossly intact.  No sensory deficit.  Respiratory: Unlabored respiratory effort, lungs clear to auscultation, no wheezes, no ronchi.  Cardiovascular: Normal S1, S2, no murmur, no edema.  Psych: Alert and oriented x3, normal affect and mood and judgement.        Assessment and Plan:   The following treatment plan was discussed    1. Uncontrolled type 2 diabetes mellitus with hyperglycemia (HCC)chronic, ongoing, in good control at 5.9 A1c.  Continue medications, walking and weight loss.  Return in 6 months.     2. Obesity (BMI 35.0-39.9 without comorbidity) (HCC)  Chronic, ongoing, improving  Continue walking, dietary controls.  Monitor and follow.     3. Psoriatic arthritis (HCC)  Chronic ongoing, followed by Dr. Calvillo.  Reviewed her enbrel no side effects reported.  Updated labs.     4. Adenomatous polyp of colon, unspecified part of colon  Due for colonoscopy.  Recommended to call and reschedule her follow up.        Followup: Return in about 6 months (around 11/18/2021) for a1c check.         Educated in proper administration of medication(s) ordered today including safety, possible SE, risks, benefits, rationale and alternatives to therapy.     Please note that this dictation was created using voice recognition software. I have made every reasonable " attempt to correct obvious errors, but I expect that there are errors of grammar and possibly content that I did not discover before finalizing the note.

## 2021-05-18 NOTE — ASSESSMENT & PLAN NOTE
Continues with enbrel, followed by rheumatology. Dr. Calvillo is following this.  Stable.  Skin clear at this time.

## 2021-05-18 NOTE — ASSESSMENT & PLAN NOTE
Results for TRANG VARELA (MRN [id number]) as of 5/18/2021 08:16   Ref. Range 5/10/2021 06:51   Glycohemoglobin Latest Ref Range: 4.0 - 5.6 % 5.9 (H)   Estim. Avg Glu Latest Units: mg/dL 123   Walking daily.  Weight is still coming down.  On ace and statin.  Doing well.  januvia and metformin.

## 2021-10-18 ENCOUNTER — HOSPITAL ENCOUNTER (OUTPATIENT)
Dept: RADIOLOGY | Facility: MEDICAL CENTER | Age: 66
End: 2021-10-18
Attending: NURSE PRACTITIONER
Payer: MEDICARE

## 2021-10-18 DIAGNOSIS — Z12.31 VISIT FOR SCREENING MAMMOGRAM: ICD-10-CM

## 2021-10-18 PROCEDURE — 77063 BREAST TOMOSYNTHESIS BI: CPT

## 2021-10-19 ENCOUNTER — PATIENT MESSAGE (OUTPATIENT)
Dept: MEDICAL GROUP | Facility: PHYSICIAN GROUP | Age: 66
End: 2021-10-19

## 2021-10-30 DIAGNOSIS — E11.8 TYPE 2 DIABETES MELLITUS WITH COMPLICATION (HCC): ICD-10-CM

## 2021-11-01 NOTE — TELEPHONE ENCOUNTER
Requested Prescriptions     Signed Prescriptions Disp Refills   • metformin (GLUCOPHAGE) 1000 MG tablet 180 Tablet 1     Sig: TAKE ONE TABLET BY MOUTH TWICE DAILY WITH FOOD     Authorizing Provider: AYDIN TOBIN A.P.R.N.

## 2021-11-15 ENCOUNTER — OFFICE VISIT (OUTPATIENT)
Dept: MEDICAL GROUP | Facility: PHYSICIAN GROUP | Age: 66
End: 2021-11-15
Payer: MEDICARE

## 2021-11-15 VITALS
WEIGHT: 165 LBS | RESPIRATION RATE: 16 BRPM | HEIGHT: 58 IN | TEMPERATURE: 32.6 F | OXYGEN SATURATION: 96 % | SYSTOLIC BLOOD PRESSURE: 110 MMHG | DIASTOLIC BLOOD PRESSURE: 70 MMHG | HEART RATE: 78 BPM | BODY MASS INDEX: 34.63 KG/M2

## 2021-11-15 DIAGNOSIS — I10 ESSENTIAL HYPERTENSION: ICD-10-CM

## 2021-11-15 DIAGNOSIS — L40.9 PSORIASIS: ICD-10-CM

## 2021-11-15 DIAGNOSIS — Z23 NEED FOR VACCINATION: ICD-10-CM

## 2021-11-15 DIAGNOSIS — E11.8 TYPE 2 DIABETES MELLITUS WITH COMPLICATION (HCC): ICD-10-CM

## 2021-11-15 DIAGNOSIS — E11.65 UNCONTROLLED TYPE 2 DIABETES MELLITUS WITH HYPERGLYCEMIA (HCC): ICD-10-CM

## 2021-11-15 DIAGNOSIS — L40.50 PSORIATIC ARTHRITIS (HCC): ICD-10-CM

## 2021-11-15 LAB
HBA1C MFR BLD: 6.5 % (ref 0–5.6)
INT CON NEG: NEGATIVE
INT CON POS: POSITIVE

## 2021-11-15 PROCEDURE — G0008 ADMIN INFLUENZA VIRUS VAC: HCPCS | Performed by: NURSE PRACTITIONER

## 2021-11-15 PROCEDURE — 83036 HEMOGLOBIN GLYCOSYLATED A1C: CPT | Performed by: NURSE PRACTITIONER

## 2021-11-15 PROCEDURE — 90662 IIV NO PRSV INCREASED AG IM: CPT | Performed by: NURSE PRACTITIONER

## 2021-11-15 PROCEDURE — 99213 OFFICE O/P EST LOW 20 MIN: CPT | Mod: 25 | Performed by: NURSE PRACTITIONER

## 2021-11-15 RX ORDER — LISINOPRIL 10 MG/1
TABLET ORAL
Qty: 90 TABLET | Refills: 1 | Status: SHIPPED | OUTPATIENT
Start: 2021-11-15 | End: 2022-05-24 | Stop reason: SDUPTHER

## 2021-11-15 RX ORDER — ROSUVASTATIN CALCIUM 20 MG/1
TABLET, COATED ORAL
Qty: 90 TABLET | Refills: 1 | Status: SHIPPED | OUTPATIENT
Start: 2021-11-15 | End: 2022-06-14

## 2021-11-15 ASSESSMENT — FIBROSIS 4 INDEX: FIB4 SCORE: 2.22

## 2021-11-15 NOTE — ASSESSMENT & PLAN NOTE
Dr. Calvillo following.  Continues with enbrel.  Stable.  chold weather notices pain in neck and knee.  Occasional tylenol.

## 2021-11-15 NOTE — PROGRESS NOTES
Chief Complaint   Patient presents with   • Follow-Up   • Diabetes   • Medication Refill       Subjective:   Kristie Farley is a 66 y.o. female here today for evaluation and management of:    Essential hypertension  bp at goal  110/70.  Taking lisinopril 10 mg daily for kidney protection.  Walking daily.     Psoriasis  Continues with enbrel    Psoriatic arthritis  Dr. Calvillo following.  Continues with enbrel.  Stable.  chold weather notices pain in neck and knee.     DM (diabetes mellitus), type 2, uncontrolled  A1c today.  6.5 up from 5.9.  Reports not watching her intake.    Due for retinal exam.  No numbness or tingling reported.           Current medicines (including changes today)  Current Outpatient Medications   Medication Sig Dispense Refill   • lisinopril (PRINIVIL) 10 MG Tab TAKE ONE TABLET BY MOUTH ONE TIME DAILY 90 Tablet 1   • rosuvastatin (CRESTOR) 20 MG Tab TAKE ONE TABLET BY MOUTH IN THE EVENING 90 Tablet 1   • metformin (GLUCOPHAGE) 1000 MG tablet TAKE ONE TABLET BY MOUTH TWICE DAILY WITH FOOD 180 Tablet 1   • ENBREL SURECLICK 50 MG/ML Solution Auto-injector      • JANUVIA 50 MG Tab TAKE ONE TABLET BY MOUTH ONE TIME DAILY  30 tablet 11   • ACCU-CHEK SMARTVIEW strip USE TO TEST BLOOD GLUCOSE LEVELS ONCE DAILY 50 Strip 6   • Blood Glucose Monitoring Suppl (ACCU-CHEK ADVANTAGE DIABETES) KIT Test blood sugar every morning. 1 Kit 0   • Lancet Device MISC Test blood sugar every morning. 100 Each 4     No current facility-administered medications for this visit.     She  has a past medical history of Diabetes (HCC), Hyperplastic colon polyp (8/30/13), Hypertension, Hypertriglyceridemia (5/9/2013), Osteopenia, Psoriasis, and Psoriatic arthritis (HCC). She also has no past medical history of Cancer (HCC).    ROS as stated in hpi  No chest pain, no shortness of breath, no abdominal pain       Objective:     /70 (BP Location: Left arm, Patient Position: Sitting)   Pulse 78   Temp (!) 0.3 °C (32.6 °F)  "(Temporal)   Resp 16   Ht 1.461 m (4' 9.5\")   Wt 74.8 kg (165 lb)   SpO2 96%  Body mass index is 35.09 kg/m².   Physical Exam:  Constitutional: Alert, no distress.  Skin: Warm, dry, good turgor,no cyanosis, no rashes in visible areas.  Eye: Equal, round and reactive, conjunctiva clear, lids normal.  Neuro: Cranial nerves 2-12 grossly intact.  No sensory deficit.  Respiratory: Unlabored respiratory effort, lungs clear to auscultation, no wheezes, no ronchi.  Cardiovascular: Normal S1, S2, no murmur, no edema.  Psych: Alert and oriented x3, normal affect and mood and judgement.        Assessment and Plan:   The following treatment plan was discussed    1. Essential hypertension  Chronic, ongoing. Stable. Refill today.  Continue to walk, work on weight loss.   - lisinopril (PRINIVIL) 10 MG Tab; TAKE ONE TABLET BY MOUTH ONE TIME DAILY  Dispense: 90 Tablet; Refill: 1    2. Type 2 diabetes mellitus with complication (HCC)  Chronic, ongoing, A1c has elevated.  Encouraged weight loss and portion control.  Continue januvia and metformin.  Refills today.  Monitor and follow.   - POCT Hemoglobin A1C    3. Need for vaccination  Due for vaccine  I have placed the below orders and discussed them with an approved delegating provider.  The MA is performing the below orders under the direction of Dr. Rosita MD.    - INFLUENZA VACCINE, HIGH DOSE (65+ ONLY)    4. Psoriasis  Chronic, ongoing stable.  On enbrel.  Followed by rheum    5. Psoriatic arthritis (HCC)  Chronic, ongoing. Stable.  Followed by Rheum.  Enbrel. Monitor.     6. Uncontrolled type 2 diabetes mellitus with hyperglycemia (HCC)  See #2      Followup: No follow-ups on file.         Educated in proper administration of medication(s) ordered today including safety, possible SE, risks, benefits, rationale and alternatives to therapy.     Please note that this dictation was created using voice recognition software. I have made every reasonable attempt to correct " obvious errors, but I expect that there are errors of grammar and possibly content that I did not discover before finalizing the note.

## 2021-11-15 NOTE — ASSESSMENT & PLAN NOTE
A1c today.  6.5 up from 5.9.  Reports not watching her intake.    Due for retinal exam.  No numbness or tingling reported.

## 2021-11-23 RX ORDER — BLOOD SUGAR DIAGNOSTIC
STRIP MISCELLANEOUS
Qty: 50 STRIP | Refills: 6 | Status: SHIPPED | OUTPATIENT
Start: 2021-11-23

## 2022-02-18 DIAGNOSIS — E11.65 UNCONTROLLED TYPE 2 DIABETES MELLITUS WITH HYPERGLYCEMIA (HCC): ICD-10-CM

## 2022-02-18 NOTE — PROGRESS NOTES
Requested Prescriptions     Signed Prescriptions Disp Refills   • SITagliptin (JANUVIA) 50 MG Tab 30 Tablet 0     Sig: Take 1 Tablet by mouth every day.       DIPTI Ruffin.

## 2022-02-18 NOTE — TELEPHONE ENCOUNTER
I left a message for the patient that her Januvia had been approved for one month, however, she needed to establish care with a new provider for further refills.  I provided the phone number for Central Scheduling and myself.

## 2022-03-18 ENCOUNTER — TELEPHONE (OUTPATIENT)
Dept: MEDICAL GROUP | Facility: PHYSICIAN GROUP | Age: 67
End: 2022-03-18
Payer: MEDICARE

## 2022-03-18 NOTE — TELEPHONE ENCOUNTER
I received a phone message from the patient requesting that I call her.  I called and reached [employer].  I left a message that I had called along with my phone number.  I do not know what the patient was calling about.

## 2022-04-27 DIAGNOSIS — E11.8 TYPE 2 DIABETES MELLITUS WITH COMPLICATION (HCC): ICD-10-CM

## 2022-05-24 ENCOUNTER — OFFICE VISIT (OUTPATIENT)
Dept: MEDICAL GROUP | Facility: PHYSICIAN GROUP | Age: 67
End: 2022-05-24
Payer: MEDICARE

## 2022-05-24 VITALS
HEART RATE: 77 BPM | DIASTOLIC BLOOD PRESSURE: 62 MMHG | BODY MASS INDEX: 34 KG/M2 | SYSTOLIC BLOOD PRESSURE: 110 MMHG | TEMPERATURE: 97.5 F | HEIGHT: 58 IN | OXYGEN SATURATION: 95 % | WEIGHT: 162 LBS

## 2022-05-24 DIAGNOSIS — I10 ESSENTIAL HYPERTENSION: ICD-10-CM

## 2022-05-24 DIAGNOSIS — E11.65 UNCONTROLLED TYPE 2 DIABETES MELLITUS WITH HYPERGLYCEMIA (HCC): ICD-10-CM

## 2022-05-24 DIAGNOSIS — M85.80 OSTEOPENIA, UNSPECIFIED LOCATION: ICD-10-CM

## 2022-05-24 DIAGNOSIS — E66.9 OBESITY (BMI 35.0-39.9 WITHOUT COMORBIDITY): ICD-10-CM

## 2022-05-24 LAB
HBA1C MFR BLD: 6.8 % (ref 0–5.6)
INT CON NEG: ABNORMAL
INT CON POS: ABNORMAL

## 2022-05-24 PROCEDURE — 99214 OFFICE O/P EST MOD 30 MIN: CPT | Performed by: NURSE PRACTITIONER

## 2022-05-24 PROCEDURE — 83036 HEMOGLOBIN GLYCOSYLATED A1C: CPT | Performed by: NURSE PRACTITIONER

## 2022-05-24 RX ORDER — LISINOPRIL 10 MG/1
TABLET ORAL
Qty: 90 TABLET | Refills: 3 | Status: SHIPPED | OUTPATIENT
Start: 2022-05-24 | End: 2023-08-08

## 2022-05-24 ASSESSMENT — ENCOUNTER SYMPTOMS
GASTROINTESTINAL NEGATIVE: 1
PALPITATIONS: 0
PSYCHIATRIC NEGATIVE: 1
NEUROLOGICAL NEGATIVE: 1
SHORTNESS OF BREATH: 0
MUSCULOSKELETAL NEGATIVE: 1
CONSTITUTIONAL NEGATIVE: 1
FEVER: 0
EYES NEGATIVE: 1
SPUTUM PRODUCTION: 0
COUGH: 0

## 2022-05-24 ASSESSMENT — FIBROSIS 4 INDEX: FIB4 SCORE: 2.22

## 2022-05-24 ASSESSMENT — PATIENT HEALTH QUESTIONNAIRE - PHQ9: CLINICAL INTERPRETATION OF PHQ2 SCORE: 0

## 2022-05-24 NOTE — PROGRESS NOTES
Subjective:     CC:    Chief Complaint   Patient presents with   • Establish Care   • Seasonal Allergies     Watery eyes    • Diabetes        HISTORY OF THE PRESENT ILLNESS: Patient is a 66 y.o. female, here today to establish care. Prior PCP was Yumiko Garcia. The below problems were discussed/reviewed at this visit:    Problem   Essential Hypertension    Taking Lisinopril 10mg QD     DM (Diabetes Mellitus), Type 2, Uncontrolled (Hcc)    Taking Metformin 1000mg BID, Januvia 50mg QD    GFR >60; microalb creat ratio <30  BP managed on ACEI/Lisinopril 10mg QD  Dyslipidemia managed on statin/rosuvastatin 20mg   No neuropathy reported  Retinal Screen: 2019 no retinopathy; get records for 2021     Osteopenia    Taking daily vitamin D  Declined bone density  No recent falls/fractures       Patient Active Problem List   Diagnosis   • Psoriatic arthritis (HCC)   • Psoriasis   • Colon polyp   • Osteopenia   • DM (diabetes mellitus), type 2, uncontrolled (HCC)   • Essential hypertension   • Well woman exam with routine gynecological exam   • Obesity (BMI 35.0-39.9 without comorbidity) (HCC)   • Toe pain, bilateral       Past Medical History:   Diagnosis Date   • Diabetes (HCC)    • Hyperplastic colon polyp 8/30/13   • Hypertension    • Hypertriglyceridemia 5/9/2013   • Osteopenia    • Psoriasis    • Psoriatic arthritis (HCC)       Current Outpatient Medications Ordered in Epic   Medication Sig Dispense Refill   • SITagliptin (JANUVIA) 50 MG Tab Take 1 Tablet by mouth every day. 90 Tablet 3   • metformin (GLUCOPHAGE) 1000 MG tablet Take 1 Tablet by mouth 2 times a day with meals. 180 Tablet 3   • lisinopril (PRINIVIL) 10 MG Tab TAKE ONE TABLET BY MOUTH ONE TIME DAILY 90 Tablet 3   • ACCU-CHEK SMARTVIEW strip USE TO TEST BLOOD GLUCOSE LEVELS ONCE DAILY 50 Strip 6   • rosuvastatin (CRESTOR) 20 MG Tab TAKE ONE TABLET BY MOUTH IN THE EVENING 90 Tablet 1   • ENBREL SURECLICK 50 MG/ML Solution Auto-injector      • Blood Glucose  "Monitoring Suppl (ACCU-CHEK ADVANTAGE DIABETES) KIT Test blood sugar every morning. 1 Kit 0   • Lancet Device MISC Test blood sugar every morning. 100 Each 4     No current Epic-ordered facility-administered medications on file.        Past Surgical History:   Procedure Laterality Date   • COLONOSCOPY  08/30/13   • CATARACT EXTRACTION WITH IOL     • TONSILLECTOMY          Allergies:  Patient has no known allergies.    Health Maintenance: Completed    ROS:   Review of Systems   Constitutional: Negative.  Negative for fever and malaise/fatigue.   HENT: Negative.    Eyes: Negative.    Respiratory: Negative for cough, sputum production and shortness of breath.    Cardiovascular: Negative for chest pain, palpitations and leg swelling.   Gastrointestinal: Negative.    Genitourinary: Negative.    Musculoskeletal: Negative.    Neurological: Negative.    Endo/Heme/Allergies: Negative.    Psychiatric/Behavioral: Negative.        Objective:     Exam: /62 (BP Location: Left arm, Patient Position: Sitting, BP Cuff Size: Large adult)   Pulse 77   Temp 36.4 °C (97.5 °F) (Temporal)   Ht 1.461 m (4' 9.5\")   Wt 73.5 kg (162 lb)   SpO2 95%  Body mass index is 34.45 kg/m².    Physical Exam  Constitutional:       Appearance: Normal appearance.   Cardiovascular:      Rate and Rhythm: Normal rate and regular rhythm.      Pulses: Normal pulses.      Heart sounds: Normal heart sounds.   Pulmonary:      Effort: Pulmonary effort is normal.      Breath sounds: Normal breath sounds.   Musculoskeletal:         General: Normal range of motion.      Cervical back: Normal range of motion and neck supple.   Skin:     General: Skin is warm and dry.   Neurological:      General: No focal deficit present.      Mental Status: She is alert and oriented to person, place, and time.   Psychiatric:         Mood and Affect: Mood normal.         Behavior: Behavior normal.         Thought Content: Thought content normal.         Judgment: Judgment " normal.       Labs: reviewed from 2021    Assessment & Plan:   66 y.o. female with the following -    Problem List Items Addressed This Visit     Osteopenia    Relevant Orders    CBC WITH DIFFERENTIAL    VITAMIN D,25 HYDROXY    DM (diabetes mellitus), type 2, uncontrolled (HCC)     A1C today in clinic 6.8 (up from 6.5 last year)  - continue  Metformin 1000mg BID, Januvia 50mg QD  - counseled on eliminating added sugars in dietary intake; cut down rice, add more steamed fresh vegetables for fullness  - recheck A1c prior to next visit            Relevant Medications    SITagliptin (JANUVIA) 50 MG Tab    metformin (GLUCOPHAGE) 1000 MG tablet    Other Relevant Orders    POCT  A1C (Completed)    HEMOGLOBIN A1C    MICROALBUMIN CREAT RATIO URINE    Essential hypertension     BP today in clinic 110/62; no CP, palpitations, dizziness  - continue Lisinopril 10mg QD  - monitor annual CMP (ordered today)           Relevant Medications    lisinopril (PRINIVIL) 10 MG Tab    Other Relevant Orders    Comp Metabolic Panel    Lipid Profile    Obesity (BMI 35.0-39.9 without comorbidity) (HCC)    Relevant Medications    SITagliptin (JANUVIA) 50 MG Tab    metformin (GLUCOPHAGE) 1000 MG tablet    Other Relevant Orders    CBC WITH DIFFERENTIAL    VITAMIN D,25 HYDROXY          Medications Prescribed Today:  1. Uncontrolled type 2 diabetes mellitus with hyperglycemia (HCC)  - SITagliptin (JANUVIA) 50 MG Tab; Take 1 Tablet by mouth every day.  Dispense: 90 Tablet; Refill: 3  - metformin (GLUCOPHAGE) 1000 MG tablet; Take 1 Tablet by mouth 2 times a day with meals.  Dispense: 180 Tablet; Refill: 3    2. Essential hypertension  - lisinopril (PRINIVIL) 10 MG Tab; TAKE ONE TABLET BY MOUTH ONE TIME DAILY  Dispense: 90 Tablet; Refill: 3    Educated in proper administration of medication(s) ordered today including safety, possible SE, risks, benefits, rationale and alternatives to therapy.     Return in about 4 months (around 9/24/2022) for Dm2  mgt/lab review.    Please note that this dictation was created using voice recognition software. I have made every reasonable attempt to correct obvious errors, but I expect that there are errors of grammar and possibly content that I did not discover before finalizing the note.

## 2022-05-24 NOTE — ASSESSMENT & PLAN NOTE
A1C today in clinic 6.8 (up from 6.5 last year)  - continue  Metformin 1000mg BID, Januvia 50mg QD  - counseled on eliminating added sugars in dietary intake; cut down rice, add more steamed fresh vegetables for fullness  - recheck A1c prior to next visit

## 2022-05-24 NOTE — LETTER
[facility]  VANNESA JavierP.  [address]  [place] [address]  Fax: [phone]   Authorization for Release/Disclosure of   Protected Health Information   Name: TRANG FARLEY : 1955 SSN: [id number]   Address: [facility] [place] [address] Phone:    [phone] (home)    I authorize the entity listed below to release/disclose the PHI below to:   [facility]/Mary He D.N.P. and Mary He D.N.P.   Provider or Entity Name:  [facility]   Address   City, State, [facility]   Phone:      Fax:  [phone]   Reason for request: continuity of care   Information to be released:    [  ] LAST COLONOSCOPY,  including any PATH REPORT and follow-up  [  ] LAST FIT/COLOGUARD RESULT [  ] LAST DEXA  [  ] LAST MAMMOGRAM  [  ] LAST PAP  [  ] LAST LABS [  ] RETINA EXAM REPORT  [  ] IMMUNIZATION RECORDS  [  ] Release all info      [  ] Check here and initial the line next to each item to release ALL health information INCLUDING  _____ Care and treatment for drug and / or alcohol abuse  _____ HIV testing, infection status, or AIDS  _____ Genetic Testing    DATES OF SERVICE OR TIME PERIOD TO BE DISCLOSED: _____________  I understand and acknowledge that:  * This Authorization may be revoked at any time by you in writing, except if your health information has already been used or disclosed.  * Your health information that will be used or disclosed as a result of you signing this authorization could be re-disclosed by the recipient. If this occurs, your re-disclosed health information may no longer be protected by State or Federal laws.  * You may refuse to sign this Authorization. Your refusal will not affect your ability to obtain treatment.  * This Authorization becomes effective upon signing and will  on (date) __________.      If no date is indicated, this Authorization will  one (1) year from the signature date.    Name: Trang Farley    Signature:   Date:     2022       PLEASE FAX REQUESTED RECORDS  BACK TO: [phone]

## 2022-06-14 RX ORDER — ROSUVASTATIN CALCIUM 20 MG/1
TABLET, COATED ORAL
Qty: 90 TABLET | Refills: 3 | Status: SHIPPED | OUTPATIENT
Start: 2022-06-14 | End: 2023-06-06

## 2022-07-27 ENCOUNTER — HOSPITAL ENCOUNTER (OUTPATIENT)
Dept: LAB | Facility: MEDICAL CENTER | Age: 67
End: 2022-07-27
Attending: NURSE PRACTITIONER
Payer: MEDICARE

## 2022-07-27 DIAGNOSIS — E11.65 UNCONTROLLED TYPE 2 DIABETES MELLITUS WITH HYPERGLYCEMIA (HCC): ICD-10-CM

## 2022-07-27 DIAGNOSIS — M85.80 OSTEOPENIA, UNSPECIFIED LOCATION: ICD-10-CM

## 2022-07-27 DIAGNOSIS — I10 ESSENTIAL HYPERTENSION: ICD-10-CM

## 2022-07-27 DIAGNOSIS — E66.9 OBESITY (BMI 35.0-39.9 WITHOUT COMORBIDITY): ICD-10-CM

## 2022-07-27 LAB
25(OH)D3 SERPL-MCNC: 64 NG/ML (ref 30–100)
ALBUMIN SERPL BCP-MCNC: 4.6 G/DL (ref 3.2–4.9)
ALBUMIN/GLOB SERPL: 1.5 G/DL
ALP SERPL-CCNC: 46 U/L (ref 30–99)
ALT SERPL-CCNC: 8 U/L (ref 2–50)
ANION GAP SERPL CALC-SCNC: 11 MMOL/L (ref 7–16)
AST SERPL-CCNC: 21 U/L (ref 12–45)
BASOPHILS # BLD AUTO: 1 % (ref 0–1.8)
BASOPHILS # BLD: 0.05 K/UL (ref 0–0.12)
BILIRUB SERPL-MCNC: 0.6 MG/DL (ref 0.1–1.5)
BUN SERPL-MCNC: 13 MG/DL (ref 8–22)
CALCIUM SERPL-MCNC: 9.5 MG/DL (ref 8.5–10.5)
CHLORIDE SERPL-SCNC: 102 MMOL/L (ref 96–112)
CHOLEST SERPL-MCNC: 100 MG/DL (ref 100–199)
CO2 SERPL-SCNC: 25 MMOL/L (ref 20–33)
CREAT SERPL-MCNC: 0.78 MG/DL (ref 0.5–1.4)
CREAT UR-MCNC: 51.91 MG/DL
EOSINOPHIL # BLD AUTO: 0.07 K/UL (ref 0–0.51)
EOSINOPHIL NFR BLD: 1.4 % (ref 0–6.9)
ERYTHROCYTE [DISTWIDTH] IN BLOOD BY AUTOMATED COUNT: 35.3 FL (ref 35.9–50)
EST. AVERAGE GLUCOSE BLD GHB EST-MCNC: 134 MG/DL
FASTING STATUS PATIENT QL REPORTED: NORMAL
GFR SERPLBLD CREATININE-BSD FMLA CKD-EPI: 83 ML/MIN/1.73 M 2
GLOBULIN SER CALC-MCNC: 3 G/DL (ref 1.9–3.5)
GLUCOSE SERPL-MCNC: 115 MG/DL (ref 65–99)
HBA1C MFR BLD: 6.3 % (ref 4–5.6)
HCT VFR BLD AUTO: 39.3 % (ref 37–47)
HDLC SERPL-MCNC: 47 MG/DL
HGB BLD-MCNC: 13.1 G/DL (ref 12–16)
IMM GRANULOCYTES # BLD AUTO: 0.01 K/UL (ref 0–0.11)
IMM GRANULOCYTES NFR BLD AUTO: 0.2 % (ref 0–0.9)
LDLC SERPL CALC-MCNC: 20 MG/DL
LYMPHOCYTES # BLD AUTO: 2.1 K/UL (ref 1–4.8)
LYMPHOCYTES NFR BLD: 40.6 % (ref 22–41)
MCH RBC QN AUTO: 22.3 PG (ref 27–33)
MCHC RBC AUTO-ENTMCNC: 33.3 G/DL (ref 33.6–35)
MCV RBC AUTO: 66.8 FL (ref 81.4–97.8)
MICROALBUMIN UR-MCNC: <1.2 MG/DL
MICROALBUMIN/CREAT UR: NORMAL MG/G (ref 0–30)
MONOCYTES # BLD AUTO: 0.37 K/UL (ref 0–0.85)
MONOCYTES NFR BLD AUTO: 7.2 % (ref 0–13.4)
NEUTROPHILS # BLD AUTO: 2.57 K/UL (ref 2–7.15)
NEUTROPHILS NFR BLD: 49.6 % (ref 44–72)
NRBC # BLD AUTO: 0 K/UL
NRBC BLD-RTO: 0 /100 WBC
PLATELET # BLD AUTO: 244 K/UL (ref 164–446)
PMV BLD AUTO: 10.5 FL (ref 9–12.9)
POTASSIUM SERPL-SCNC: 4.7 MMOL/L (ref 3.6–5.5)
PROT SERPL-MCNC: 7.6 G/DL (ref 6–8.2)
RBC # BLD AUTO: 5.88 M/UL (ref 4.2–5.4)
SODIUM SERPL-SCNC: 138 MMOL/L (ref 135–145)
TRIGL SERPL-MCNC: 165 MG/DL (ref 0–149)
WBC # BLD AUTO: 5.2 K/UL (ref 4.8–10.8)

## 2022-07-27 PROCEDURE — 36415 COLL VENOUS BLD VENIPUNCTURE: CPT

## 2022-07-27 PROCEDURE — 85025 COMPLETE CBC W/AUTO DIFF WBC: CPT

## 2022-07-27 PROCEDURE — 80061 LIPID PANEL: CPT

## 2022-07-27 PROCEDURE — 83036 HEMOGLOBIN GLYCOSYLATED A1C: CPT | Mod: GA

## 2022-07-27 PROCEDURE — 82570 ASSAY OF URINE CREATININE: CPT

## 2022-07-27 PROCEDURE — 80053 COMPREHEN METABOLIC PANEL: CPT

## 2022-07-27 PROCEDURE — 82306 VITAMIN D 25 HYDROXY: CPT

## 2022-07-27 PROCEDURE — 82043 UR ALBUMIN QUANTITATIVE: CPT

## 2022-09-21 ENCOUNTER — OFFICE VISIT (OUTPATIENT)
Dept: MEDICAL GROUP | Facility: PHYSICIAN GROUP | Age: 67
End: 2022-09-21
Payer: MEDICARE

## 2022-09-21 VITALS
SYSTOLIC BLOOD PRESSURE: 112 MMHG | HEIGHT: 57 IN | WEIGHT: 165 LBS | HEART RATE: 68 BPM | DIASTOLIC BLOOD PRESSURE: 74 MMHG | OXYGEN SATURATION: 97 % | TEMPERATURE: 97.1 F | BODY MASS INDEX: 35.6 KG/M2

## 2022-09-21 DIAGNOSIS — E78.1 HYPERTRIGLYCERIDEMIA: ICD-10-CM

## 2022-09-21 DIAGNOSIS — E11.65 UNCONTROLLED TYPE 2 DIABETES MELLITUS WITH HYPERGLYCEMIA (HCC): ICD-10-CM

## 2022-09-21 DIAGNOSIS — L40.50 PSORIATIC ARTHRITIS (HCC): ICD-10-CM

## 2022-09-21 DIAGNOSIS — R71.8 MICROCYTOSIS: ICD-10-CM

## 2022-09-21 PROCEDURE — 99214 OFFICE O/P EST MOD 30 MIN: CPT | Performed by: NURSE PRACTITIONER

## 2022-09-21 ASSESSMENT — FIBROSIS 4 INDEX: FIB4 SCORE: 2.01

## 2022-09-21 NOTE — PROGRESS NOTES
Subjective:     CC:   Chief Complaint   Patient presents with    Diabetes    Lab Results        HPI:   Patient is a 66 y.o. female here today for evaluation and management of DM2, review of labs. She has no active concerns today.    Problem   Microcytosis     Latest Reference Range & Units 5/10/21 06:51 7/27/22 06:21   WBC 4.8 - 10.8 K/uL 5.7 5.2   RBC 4.20 - 5.40 M/uL 5.90 (H) 5.88 (H)   Hemoglobin 12.0 - 16.0 g/dL 13.1 13.1   Hematocrit 37.0 - 47.0 % 40.3 39.3   MCV 81.4 - 97.8 fL 68.3 (L) 66.8 (L)   MCH 27.0 - 33.0 pg 22.2 (L) 22.3 (L)   MCHC 33.6 - 35.0 g/dL 32.5 (L) 33.3 (L)   RDW 35.9 - 50.0 fL 37.2 35.3 (L)   Platelet Count 164 - 446 K/uL 231 244   MPV 9.0 - 12.9 fL 11.6 10.5        DM (Diabetes Mellitus), Type 2, Uncontrolled (Hcc)    Taking Metformin 1000mg BID, Januvia 50mg QD    GFR 83; microalb creat ratio <30  BP managed on Lisinopril 10mg QD  Dyslipidemia managed on Rosuvastatin 20mg   No neuropathy reported; normal monofilament today  [facility]inal Screen: [facility], no retinopathy; concern for cup progression     Hypertriglyceridemia    Taking Rosuvastatin 20mg QHS         Patient Active Problem List   Diagnosis    Psoriatic arthritis (HCC)    Psoriasis    Colon polyp    Osteopenia    Hypertriglyceridemia    DM (diabetes mellitus), type 2, uncontrolled (HCC)    Essential hypertension    Well woman exam with routine gynecological exam    Obesity (BMI 35.0-39.9 without comorbidity) (HCC)    Toe pain, bilateral    Microcytosis       Past Medical History:   Diagnosis Date    Diabetes (HCC)     Hyperplastic colon polyp 8/30/13    Hypertension     Hypertriglyceridemia 5/9/2013    Osteopenia     Psoriasis     Psoriatic arthritis (HCC)         Past Surgical History:   Procedure Laterality Date    COLONOSCOPY  08/30/13    CATARACT EXTRACTION WITH IOL      TONSILLECTOMY          Current Outpatient Medications on File Prior to Visit   Medication Sig Dispense Refill    rosuvastatin (CRESTOR) 20 MG  "Tab TAKE ONE TABLET BY MOUTH IN THE EVENING 90 Tablet 3    SITagliptin (JANUVIA) 50 MG Tab Take 1 Tablet by mouth every day. 90 Tablet 3    metformin (GLUCOPHAGE) 1000 MG tablet Take 1 Tablet by mouth 2 times a day with meals. 180 Tablet 3    lisinopril (PRINIVIL) 10 MG Tab TAKE ONE TABLET BY MOUTH ONE TIME DAILY 90 Tablet 3    ACCU-CHEK SMARTVIEW strip USE TO TEST BLOOD GLUCOSE LEVELS ONCE DAILY 50 Strip 6    ENBREL SURECLICK 50 MG/ML Solution Auto-injector       Blood Glucose Monitoring Suppl (ACCU-CHEK ADVANTAGE DIABETES) KIT Test blood sugar every morning. 1 Kit 0    Lancet Device MISC Test blood sugar every morning. 100 Each 4     No current facility-administered medications on file prior to visit.        Health Maintenance: Completed    ROS:  Review of Systems   Constitutional: Negative.  Negative for fever and malaise/fatigue.   Respiratory:  Negative for cough, sputum production and shortness of breath.    Cardiovascular:  Negative for chest pain, palpitations and leg swelling.   Genitourinary: Negative.    Musculoskeletal: Negative.    Neurological: Negative.    Endo/Heme/Allergies: Negative.      Objective:     Exam:  /74   Pulse 68   Temp 36.2 °C (97.1 °F) (Temporal)   Ht 1.448 m (4' 9\")   Wt 74.8 kg (165 lb)   SpO2 97%   BMI 35.71 kg/m²  Body mass index is 35.71 kg/m².    Physical Exam  Constitutional:       Appearance: Normal appearance.   Cardiovascular:      Rate and Rhythm: Normal rate and regular rhythm.      Pulses: Normal pulses.      Heart sounds: Normal heart sounds.   Pulmonary:      Effort: Pulmonary effort is normal.      Breath sounds: Normal breath sounds.   Musculoskeletal:      Right lower leg: No edema.      Left lower leg: No edema.   Skin:     General: Skin is warm and dry.   Neurological:      General: No focal deficit present.      Mental Status: She is alert and oriented to person, place, and time.     Monofilament testing with a 10 gram force: sensation intact: intact " bilaterally  Visual Inspection: Feet without maceration, ulcers, fissures.  Pedal pulses: intact bilaterally     Labs: reviewed with patient    Assessment & Plan:     66 y.o. female with the following -     Problem List Items Addressed This Visit       Psoriatic arthritis (HCC)    Hypertriglyceridemia      Latest Reference Range & Units 7/27/22 06:21   Cholesterol,Tot 100 - 199 mg/dL 100   Triglycerides 0 - 149 mg/dL 165 (H)   HDL >=40 mg/dL 47   LDL <100 mg/dL 20   No myalgia; hx DM2 on oral meds  - continue Rosuvastatin 20mg QHS  - heart healthy eating; stays active with walking  - monitor fasting lipid annually         DM (diabetes mellitus), type 2, uncontrolled (HCC)     A1C 6.3 on 7/27/2022 (down from 6.8)  - continue  Metformin 1000mg BID, Januvia 50mg QD  - counseled on eliminating added sugars in dietary intake; cut down rice, add more steamed fresh vegetables for fullness  - recheck A1c prior to next visit         Relevant Orders    HEMOGLOBIN A1C    Diabetic Monofilament LE Exam (Completed)    Microcytosis     Appears to be chronic in review of historic labs. H/H has remained in normal range. No active bleeding or fatigue reported. She denies alcohol use. She does smoke about 3 cigarettes per day & we discussed cessation. Will also check her iron/ferritin and supplement if needed. Thalassemia can also be considered as possible cause.         Relevant Orders    IRON/TOTAL IRON BIND    FERRITIN     HCC Gap Form    Diagnosis to address: L40.50 - Psoriatic arthritis (HCC)  Assessment and plan: Chronic, stable, as based on today's assessment and impact on other conditions evaluated today. Continue with current treatment plan: per Dr Calvillo; on Enbrel Follow-up with specialist as directed, but at least annually.  Last edited 09/24/22 18:16 PDT by Mary He D.N.P.         Educated in proper administration of medication(s) ordered today including safety, possible SE, risks, benefits, rationale and  alternatives to therapy.     Return in about 4 months (around 1/21/2023) for A1C, labs follow up.    Please note that this dictation was created using voice recognition software. I have made every reasonable attempt to correct obvious errors, but I expect that there are errors of grammar and possibly content that I did not discover before finalizing the note.

## 2022-09-24 PROBLEM — R71.8 MICROCYTOSIS: Status: ACTIVE | Noted: 2022-09-24

## 2022-09-24 ASSESSMENT — ENCOUNTER SYMPTOMS
COUGH: 0
SPUTUM PRODUCTION: 0
CONSTITUTIONAL NEGATIVE: 1
FEVER: 0
SHORTNESS OF BREATH: 0
NEUROLOGICAL NEGATIVE: 1
PALPITATIONS: 0
MUSCULOSKELETAL NEGATIVE: 1

## 2022-09-25 NOTE — ASSESSMENT & PLAN NOTE
A1C 6.3 on 7/27/2022 (down from 6.8)  - continue  Metformin 1000mg BID, Januvia 50mg QD  - counseled on eliminating added sugars in dietary intake; cut down rice, add more steamed fresh vegetables for fullness  - recheck A1c prior to next visit

## 2022-09-25 NOTE — ASSESSMENT & PLAN NOTE
Latest Reference Range & Units 7/27/22 06:21   Cholesterol,Tot 100 - 199 mg/dL 100   Triglycerides 0 - 149 mg/dL 165 (H)   HDL >=40 mg/dL 47   LDL <100 mg/dL 20     No myalgia; hx DM2 on oral meds  - continue Rosuvastatin 20mg QHS  - heart healthy eating; stays active with walking  - monitor fasting lipid annually

## 2022-09-25 NOTE — ASSESSMENT & PLAN NOTE
Appears to be chronic in review of historic labs. H/H has remained in normal range. No active bleeding or fatigue reported. She denies alcohol use. She does smoke about 3 cigarettes per day & we discussed cessation. Will also check her iron/ferritin and supplement if needed. Thalassemia can also be considered as possible cause.

## 2022-11-03 ENCOUNTER — PATIENT MESSAGE (OUTPATIENT)
Dept: HEALTH INFORMATION MANAGEMENT | Facility: OTHER | Age: 67
End: 2022-11-03

## 2022-12-30 ENCOUNTER — HOSPITAL ENCOUNTER (OUTPATIENT)
Dept: LAB | Facility: MEDICAL CENTER | Age: 67
End: 2022-12-30
Attending: NURSE PRACTITIONER
Payer: MEDICARE

## 2022-12-30 ENCOUNTER — HOSPITAL ENCOUNTER (OUTPATIENT)
Dept: LAB | Facility: MEDICAL CENTER | Age: 67
End: 2022-12-30
Attending: SPECIALIST
Payer: MEDICARE

## 2022-12-30 DIAGNOSIS — R71.8 MICROCYTOSIS: ICD-10-CM

## 2022-12-30 DIAGNOSIS — E11.65 UNCONTROLLED TYPE 2 DIABETES MELLITUS WITH HYPERGLYCEMIA (HCC): ICD-10-CM

## 2022-12-30 LAB
ALBUMIN SERPL BCP-MCNC: 4.6 G/DL (ref 3.2–4.9)
ALBUMIN/GLOB SERPL: 1.5 G/DL
ALP SERPL-CCNC: 45 U/L (ref 30–99)
ALT SERPL-CCNC: 5 U/L (ref 2–50)
ANION GAP SERPL CALC-SCNC: 11 MMOL/L (ref 7–16)
AST SERPL-CCNC: 15 U/L (ref 12–45)
BASOPHILS # BLD AUTO: 0.9 % (ref 0–1.8)
BASOPHILS # BLD: 0.06 K/UL (ref 0–0.12)
BILIRUB SERPL-MCNC: 0.7 MG/DL (ref 0.1–1.5)
BUN SERPL-MCNC: 12 MG/DL (ref 8–22)
CALCIUM ALBUM COR SERPL-MCNC: 9.5 MG/DL (ref 8.5–10.5)
CALCIUM SERPL-MCNC: 10 MG/DL (ref 8.5–10.5)
CHLORIDE SERPL-SCNC: 105 MMOL/L (ref 96–112)
CO2 SERPL-SCNC: 26 MMOL/L (ref 20–33)
CREAT SERPL-MCNC: 0.66 MG/DL (ref 0.5–1.4)
EOSINOPHIL # BLD AUTO: 0.05 K/UL (ref 0–0.51)
EOSINOPHIL NFR BLD: 0.7 % (ref 0–6.9)
ERYTHROCYTE [DISTWIDTH] IN BLOOD BY AUTOMATED COUNT: 37 FL (ref 35.9–50)
EST. AVERAGE GLUCOSE BLD GHB EST-MCNC: 128 MG/DL
FERRITIN SERPL-MCNC: 105 NG/ML (ref 10–291)
GFR SERPLBLD CREATININE-BSD FMLA CKD-EPI: 96 ML/MIN/1.73 M 2
GLOBULIN SER CALC-MCNC: 3 G/DL (ref 1.9–3.5)
GLUCOSE SERPL-MCNC: 129 MG/DL (ref 65–99)
HBA1C MFR BLD: 6.1 % (ref 4–5.6)
HCT VFR BLD AUTO: 40.6 % (ref 37–47)
HGB BLD-MCNC: 13.3 G/DL (ref 12–16)
IMM GRANULOCYTES # BLD AUTO: 0.02 K/UL (ref 0–0.11)
IMM GRANULOCYTES NFR BLD AUTO: 0.3 % (ref 0–0.9)
IRON SATN MFR SERPL: 39 % (ref 15–55)
IRON SERPL-MCNC: 111 UG/DL (ref 40–170)
LYMPHOCYTES # BLD AUTO: 2.44 K/UL (ref 1–4.8)
LYMPHOCYTES NFR BLD: 35.1 % (ref 22–41)
MCH RBC QN AUTO: 22.4 PG (ref 27–33)
MCHC RBC AUTO-ENTMCNC: 32.8 G/DL (ref 33.6–35)
MCV RBC AUTO: 68.5 FL (ref 81.4–97.8)
MONOCYTES # BLD AUTO: 0.35 K/UL (ref 0–0.85)
MONOCYTES NFR BLD AUTO: 5 % (ref 0–13.4)
NEUTROPHILS # BLD AUTO: 4.04 K/UL (ref 2–7.15)
NEUTROPHILS NFR BLD: 58 % (ref 44–72)
NRBC # BLD AUTO: 0 K/UL
NRBC BLD-RTO: 0 /100 WBC
PLATELET # BLD AUTO: 252 K/UL (ref 164–446)
PMV BLD AUTO: 11.2 FL (ref 9–12.9)
POTASSIUM SERPL-SCNC: 4.6 MMOL/L (ref 3.6–5.5)
PROT SERPL-MCNC: 7.6 G/DL (ref 6–8.2)
RBC # BLD AUTO: 5.93 M/UL (ref 4.2–5.4)
SODIUM SERPL-SCNC: 142 MMOL/L (ref 135–145)
TIBC SERPL-MCNC: 282 UG/DL (ref 250–450)
UIBC SERPL-MCNC: 171 UG/DL (ref 110–370)
WBC # BLD AUTO: 7 K/UL (ref 4.8–10.8)

## 2022-12-30 PROCEDURE — 83550 IRON BINDING TEST: CPT

## 2022-12-30 PROCEDURE — 83540 ASSAY OF IRON: CPT

## 2022-12-30 PROCEDURE — 83036 HEMOGLOBIN GLYCOSYLATED A1C: CPT | Mod: GA

## 2022-12-30 PROCEDURE — 82728 ASSAY OF FERRITIN: CPT

## 2022-12-30 PROCEDURE — 80053 COMPREHEN METABOLIC PANEL: CPT

## 2022-12-30 PROCEDURE — 36415 COLL VENOUS BLD VENIPUNCTURE: CPT | Mod: GA

## 2022-12-30 PROCEDURE — 85025 COMPLETE CBC W/AUTO DIFF WBC: CPT

## 2023-01-20 ENCOUNTER — OFFICE VISIT (OUTPATIENT)
Dept: MEDICAL GROUP | Facility: PHYSICIAN GROUP | Age: 68
End: 2023-01-20
Payer: MEDICARE

## 2023-01-20 VITALS
SYSTOLIC BLOOD PRESSURE: 112 MMHG | TEMPERATURE: 96.8 F | HEART RATE: 83 BPM | HEIGHT: 57 IN | OXYGEN SATURATION: 97 % | BODY MASS INDEX: 35.6 KG/M2 | DIASTOLIC BLOOD PRESSURE: 68 MMHG | RESPIRATION RATE: 16 BRPM | WEIGHT: 165 LBS

## 2023-01-20 DIAGNOSIS — Z00.00 ENCOUNTER FOR ANNUAL WELLNESS VISIT (AWV) IN MEDICARE PATIENT: ICD-10-CM

## 2023-01-20 DIAGNOSIS — E11.9 TYPE 2 DIABETES MELLITUS WITHOUT COMPLICATION, WITHOUT LONG-TERM CURRENT USE OF INSULIN (HCC): ICD-10-CM

## 2023-01-20 DIAGNOSIS — E78.1 HYPERTRIGLYCERIDEMIA: ICD-10-CM

## 2023-01-20 DIAGNOSIS — L40.50 PSORIATIC ARTHRITIS (HCC): ICD-10-CM

## 2023-01-20 DIAGNOSIS — I10 ESSENTIAL HYPERTENSION: Chronic | ICD-10-CM

## 2023-01-20 PROCEDURE — G0439 PPPS, SUBSEQ VISIT: HCPCS | Performed by: NURSE PRACTITIONER

## 2023-01-20 ASSESSMENT — ENCOUNTER SYMPTOMS: GENERAL WELL-BEING: EXCELLENT

## 2023-01-20 ASSESSMENT — FIBROSIS 4 INDEX: FIB4 SCORE: 1.78

## 2023-01-20 ASSESSMENT — PATIENT HEALTH QUESTIONNAIRE - PHQ9: CLINICAL INTERPRETATION OF PHQ2 SCORE: 0

## 2023-01-20 ASSESSMENT — ACTIVITIES OF DAILY LIVING (ADL): BATHING_REQUIRES_ASSISTANCE: 0

## 2023-01-21 NOTE — PROGRESS NOTES
Chief Complaint   Patient presents with    Annual Wellness Visit       HPI:  Kristie Farley is a 67 y.o. here for Medicare Annual Wellness Visit. She is doing well today and has no active complaints. Her  is a retired vet and she helps with his medication management at home.     Patient Active Problem List    Diagnosis Date Noted    Microcytosis 09/24/2022    Toe pain, bilateral 04/19/2021    Obesity (BMI 35.0-39.9 without comorbidity) ([facility]) 11/03/2017    Well woman exam with routine gynecological exam 04/07/2017    Essential hypertension 08/08/2016    Type 2 diabetes mellitus without complication, without long-term current use of insulin ([facility]) 10/20/2015    Hypertriglyceridemia 05/09/2013    Colon polyp 04/24/2013    Osteopenia 04/24/2013    Psoriatic arthritis ([facility])     Psoriasis        Current Outpatient Medications   Medication Sig Dispense Refill    rosuvastatin (CRESTOR) 20 MG Tab TAKE ONE TABLET BY MOUTH IN THE EVENING 90 Tablet 3    SITagliptin (JANUVIA) 50 MG Tab Take 1 Tablet by mouth every day. 90 Tablet 3    metformin (GLUCOPHAGE) 1000 MG tablet Take 1 Tablet by mouth 2 times a day with meals. 180 Tablet 3    lisinopril (PRINIVIL) 10 MG Tab TAKE ONE TABLET BY MOUTH ONE TIME DAILY 90 Tablet 3    ACCU-CHEK SMARTVIEW strip USE TO TEST BLOOD GLUCOSE LEVELS ONCE DAILY 50 Strip 6    ENBREL SURECLICK 50 MG/ML Solution Auto-injector       Blood Glucose Monitoring Suppl (ACCU-CHEK ADVANTAGE DIABETES) KIT Test blood sugar every morning. 1 Kit 0    Lancet Device MISC Test blood sugar every morning. 100 Each 4     No current facility-administered medications for this visit.          Current supplements as per medication list.     Allergies: Patient has no known allergies.    Current social contact/activities:     She  reports that she has been smoking cigarettes. She has been smoking an average of .25 packs per day. She has never used smokeless tobacco. She reports that she does not drink alcohol and does not use  drugs.  Ready to quit: Not Answered  Counseling given: Not Answered  Tobacco comments: 3 cigs a day      ROS:    Gait: Uses no assistive device  Ostomy: No  Other tubes: No  Amputations: No  Chronic oxygen use: No  Last eye exam: 2022 feb   Wears hearing aids: No   : Denies any urinary leakage during the last 6 months    Screening:  Depression Screening  Little interest or pleasure in doing things?  0 - not at all  Feeling down, depressed , or hopeless? 0 - not at all  Patient Health Questionnaire Score: 0     If depressive symptoms identified deferred to follow up visit unless specifically addressed in assessment and plan.    Interpretation of PHQ-9 Total Score   Score Severity   1-4 No Depression   5-9 Mild Depression   10-14 Moderate Depression   15-19 Moderately Severe Depression   20-27 Severe Depression    Screening for Cognitive Impairment  Three Minute Recall (daughter, heaven, mountain) 3/3    Mike clock face with all 12 numbers and set the hands to show 10 past 11.  Yes    Cognitive concerns identified deferred for follow up unless specifically addressed in assessment and plan.    Fall Risk Assessment  Has the patient had two or more falls in the last year or any fall with injury in the last year?  No    Safety Assessment  Throw rugs on floor.  Yes  Handrails on all stairs.  Yes  Good lighting in all hallways.  Yes  Difficulty hearing.  Yes  Patient counseled about all safety risks that were identified.    Functional Assessment ADLs  Are there any barriers preventing you from cooking for yourself or meeting nutritional needs?  No.    Are there any barriers preventing you from driving safely or obtaining transportation?  No.    Are there any barriers preventing you from using a telephone or calling for help?  No.    Are there any barriers preventing you from shopping?  No.    Are there any barriers preventing you from taking care of your own finances?  No.    Are there any barriers preventing you from  managing your medications?  No.    Are there any barriers preventing you from showering, bathing or dressing yourself?  No.    Are you currently engaging in any exercise or physical activity?  No.     What is your perception of your health?  Excellent    Advance Care Planning  Do you have an Advance Directive, Living Will, Durable Power of , or POLST? Yes    Living Will            Health Maintenance Summary            Overdue - Annual Wellness Visit (Every 366 Days) Overdue - never done      No completion history exists for this topic.              RETINAL SCREENING (Yearly) Due soon on 2/10/2023      02/10/2022  REFERRAL FOR RETINAL SCREENING EXAM    02/10/2022  REFERRAL FOR RETINAL SCREENING EXAM    03/21/2019  REFERRAL FOR RETINAL SCREENING EXAM    03/16/2018  REFERRAL FOR RETINAL SCREENING EXAM    03/04/2016  AMB REFERRAL FOR RETINAL SCREENING EXAM    Only the first 5 history entries have been loaded, but more history exists.              Postponed - BONE DENSITY (Every 5 Years) Postponed until 5/24/2023      No completion history exists for this topic.              A1C SCREENING (Every 6 Months) Next due on 6/30/2023 12/30/2022  HEMOGLOBIN A1C    07/27/2022  HEMOGLOBIN A1C    05/24/2022  POCT  A1C    11/15/2021  POCT Hemoglobin A1C    05/10/2021  HEMOGLOBIN A1C    Only the first 5 history entries have been loaded, but more history exists.              FASTING LIPID PROFILE (Yearly) Next due on 7/27/2023 07/27/2022  Lipid Profile    05/10/2021  Lipid Profile    04/27/2020  Lipid Profile    11/04/2019  Lipid Profile    10/19/2018  LIPID PROFILE    Only the first 5 history entries have been loaded, but more history exists.              URINE ACR / MICROALBUMIN (Yearly) Next due on 7/27/2023 07/27/2022  MICROALBUMIN CREAT RATIO URINE    05/10/2021  MICROALBUMIN CREAT RATIO URINE    11/04/2019  MICROALBUMIN CREAT RATIO URINE    10/19/2018  MICROALBUMIN CREAT RATIO URINE    01/19/2018   MICROALBUMIN CREAT RATIO URINE    Only the first 5 history entries have been loaded, but more history exists.              COLORECTAL CANCER SCREENING (COLONOSCOPY - Every 10 Years) Next due on 8/30/2023 08/30/2013  COLONOSCOPY (Done)              DIABETES MONOFILAMENT / LE EXAM (Yearly) Next due on 9/24/2023 09/24/2022  Diabetic Monofilament LE Exam    09/21/2022  [employer]: WORKFLOW - DIABETES - DIABETIC FOOT EXAM PERFORMED    11/17/2020  Smart[employer]: WORKFLOW - DIABETES - DIABETIC FOOT EXAM PERFORMED    11/17/2020  Diabetic Monofilament LE Exam    05/14/2019  Diabetic Monofilament LE Exam    Only the first 5 history entries have been loaded, but more history exists.              MAMMOGRAM (Every 2 Years) Next due on 10/18/2023      10/18/2021  MA-SCREENING MAMMO BILAT W/TOMOSYNTHESIS W/CAD    05/16/2019  MA-SCREEN MAMMO W/CAD-BILAT    12/08/2017  MA-SCREEN MAMMO W/CAD-BILAT    09/21/2015  MA-SCREEN MAMMO W/CAD-BILAT    05/29/2014  MA-SCREEING MAMMOGRAM W/ CAD    Only the first 5 history entries have been loaded, but more history exists.              SERUM CREATININE (Yearly) Next due on 12/30/2023 12/30/2022  Comp Metabolic Panel    07/27/2022  Comp Metabolic Panel    05/10/2021  Comp Metabolic Panel    07/16/2020  Comp Metabolic Panel    11/04/2019  Comp Metabolic Panel    Only the first 5 history entries have been loaded, but more history exists.              IMM DTaP/Tdap/Td Vaccine (2 - Td or Tdap) Next due on 6/25/2024 06/25/2014  Imm Admin: Tdap Vaccine              HEPATITIS C SCREENING  Completed      04/27/2020  HEP C VIRUS ANTIBODY    12/02/2019  HEPATITIS PANEL ACUTE(4 COMPONENTS)              IMM ZOSTER VACCINES (Series Information) Completed      09/21/2020  Imm Admin: Zoster Vaccine Recombinant (RZV) (SHINGRIX)    06/17/2020  Imm Admin: Zoster Vaccine Recombinant (RZV) (SHINGRIX)    10/20/2015  Imm Admin: Zoster Vaccine Live (ZVL) (Zostavax) - HISTORICAL DATA              IMM  PNEUMOCOCCAL VACCINE: 65+ Years (Series Information) Completed      11/17/2020  Imm Admin: Pneumococcal polysaccharide vaccine (PPSV-23)    10/19/2018  Imm Admin: Pneumococcal Conjugate Vaccine (Prevnar/PCV-13)    01/16/2014  Imm Admin: Pneumococcal polysaccharide vaccine (PPSV-23)              IMM INFLUENZA (Series Information) Completed      09/25/2022  Outside Immunization: Fluzone High-Dose Quad    11/15/2021  Imm Admin: Influenza Vaccine Adult HD    11/17/2020  Imm Admin: Influenza Vaccine Adult HD    11/12/2019  Imm Admin: Influenza Vaccine Quad Inj (Pf)    10/19/2018  Imm Admin: Influenza Vaccine Quad Inj (Pf)    Only the first 5 history entries have been loaded, but more history exists.              COVID-19 Vaccine (Series Information) Completed      09/25/2022  Imm Admin: PFIZER BIVALENT BOOSTER SARS-COV-2 VACCINE (12+)    10/27/2021  Imm Admin: PFIZER PURPLE CAP SARS-COV-2 VACCINATION (12+)    04/16/2021  Imm Admin: PFIZER PURPLE CAP SARS-COV-2 VACCINATION (12+)    03/24/2021  Imm Admin: PFIZER PURPLE CAP SARS-COV-2 VACCINATION (12+)              IMM HEP B VACCINE (Series Information) Aged Out      No completion history exists for this topic.              IMM MENINGOCOCCAL ACWY VACCINE (Series Information) Aged Out      No completion history exists for this topic.              Discontinued - CERVICAL CANCER SCREENING  Discontinued        Frequency changed to Never automatically (Topic No Longer Applies)    04/07/2017  PATHOLOGY GYN SPECIMEN    04/07/2017  THINPREP PAP,REFLEX HPV ON ASC-US ONLY    04/24/2013  PAP IG, RFX HPV ALL PTH                    Patient Care Team:  Mary He D.N.P. as PCP - General (Nurse Practitioner Family)    Social History     Tobacco Use    Smoking status: Some Days     Packs/day: 0.25     Types: Cigarettes    Smokeless tobacco: Never    Tobacco comments:     3 cigs a day   Substance Use Topics    Alcohol use: No     Alcohol/week: 0.0 oz     Comment: special occasions     "Drug use: No     No family history on file.  She  has a past medical history of Diabetes ([facility]), Hyperplastic colon polyp (8/30/13), Hypertension, Hypertriglyceridemia (5/9/2013), Osteopenia, Psoriasis, and Psoriatic arthritis ([facility]).    She has no past medical history of Cancer ([facility]).   Past Surgical History:   Procedure Laterality Date    COLONOSCOPY  08/30/13    CATARACT EXTRACTION WITH IOL      TONSILLECTOMY         Exam:   /68   Pulse 83   Temp 36 °C (96.8 °F) (Tympanic)   Resp 16   Ht 1.448 m (4' 9\")   Wt 74.8 kg (165 lb)   SpO2 97%  Body mass index is 35.71 kg/m².    Hearing good.    Dentition good  Alert, oriented in no acute distress.  Eye contact is good, speech goal directed, affect calm    Physical Exam  Constitutional:       Appearance: Normal appearance.   Cardiovascular:      Rate and Rhythm: Normal rate and regular rhythm.      Pulses: Normal pulses.      Heart sounds: Normal heart sounds.   Pulmonary:      Effort: Pulmonary effort is normal.      Breath sounds: Normal breath sounds.   Musculoskeletal:      Right lower leg: No edema.      Left lower leg: No edema.   Skin:     General: Skin is warm and dry.   Neurological:      General: No focal deficit present.      Mental Status: She is alert and oriented to person, place, and time.        Assessment and Plan. The following treatment and monitoring plan is recommended:    1. Type 2 diabetes mellitus without complication, without long-term current use of insulin ([facility])  Chronic; A1C goal <6.5  A1C 6.1 on 12/30/2022 (down from 6.3)  - continue  Metformin 1000mg BID, Januvia 50mg QD  - counseled on eliminating added sugars in dietary intake; cut down rice, add more steamed fresh vegetables for fullness  - recheck A1c in 6 months    2. Hypertriglyceridemia   Latest Reference Range & Units 07/27/22 06:21   Cholesterol,Tot 100 - 199 mg/dL 100   Triglycerides 0 - 149 mg/dL 165 (H)   HDL >=40 mg/dL 47   LDL <100 mg/dL 20     Chronic; tolerating " statin; no myalgia; hx DM2 on oral meds  - continue Rosuvastatin 20mg QHS  - heart healthy eating, increase fiber intake; stays active with walking  - monitor fasting lipid annually    3. Essential hypertension  Chronic; Goal BP < 130/80  BP today in clinic 112/68; no CP, palpitations, dizziness, edema reported today  GFR 96; creat 0.66  - continue Lisinopril 10mg QD  - monitor annual CMP/fasting lipid    4. Psoriatic arthritis (HCC)  Chronic; Managed by Rheumatology/Dr Rajinder Capps on Enbrel IJ  Uses tylenol for occasional joint pain    5. Encounter for annual wellness visit (AWV) in Medicare patient  Services suggested: No services needed at this time  Health Care Screening: Age-appropriate preventive services recommended by USPTF and ACIP covered by Medicare were discussed today. Services ordered if indicated and agreed upon by the patient.  Referrals offered: Community-based lifestyle interventions to reduce health risks and promote self-management and wellness, fall prevention, nutrition, physical activity, tobacco-use cessation, weight loss, and mental health services as per orders if indicated.    Discussion today about general wellness and lifestyle habits:    Prevent falls and reduce trip hazards; Cautioned about securing or removing rugs.  Have a working fire alarm and carbon monoxide detector;   Engage in regular physical activity and social activities     Follow-up: Return in about 4 months (around 5/20/2023) for DM2 mgt.

## 2023-05-24 ENCOUNTER — OFFICE VISIT (OUTPATIENT)
Dept: MEDICAL GROUP | Facility: PHYSICIAN GROUP | Age: 68
End: 2023-05-24
Payer: MEDICARE

## 2023-05-24 VITALS
TEMPERATURE: 97 F | WEIGHT: 156 LBS | DIASTOLIC BLOOD PRESSURE: 70 MMHG | SYSTOLIC BLOOD PRESSURE: 128 MMHG | HEIGHT: 57 IN | HEART RATE: 82 BPM | BODY MASS INDEX: 33.66 KG/M2 | OXYGEN SATURATION: 98 %

## 2023-05-24 DIAGNOSIS — E66.9 OBESITY (BMI 30-39.9): ICD-10-CM

## 2023-05-24 DIAGNOSIS — E11.9 TYPE 2 DIABETES MELLITUS WITHOUT COMPLICATION, WITHOUT LONG-TERM CURRENT USE OF INSULIN (HCC): Chronic | ICD-10-CM

## 2023-05-24 LAB
HBA1C MFR BLD: 6.9 % (ref ?–5.8)
POCT INT CON NEG: NEGATIVE
POCT INT CON POS: POSITIVE

## 2023-05-24 PROCEDURE — 99214 OFFICE O/P EST MOD 30 MIN: CPT | Performed by: NURSE PRACTITIONER

## 2023-05-24 PROCEDURE — 3078F DIAST BP <80 MM HG: CPT | Performed by: NURSE PRACTITIONER

## 2023-05-24 PROCEDURE — 83036 HEMOGLOBIN GLYCOSYLATED A1C: CPT | Performed by: NURSE PRACTITIONER

## 2023-05-24 PROCEDURE — 3074F SYST BP LT 130 MM HG: CPT | Performed by: NURSE PRACTITIONER

## 2023-05-24 RX ORDER — LISINOPRIL 10 MG/1
1 TABLET ORAL
COMMUNITY
End: 2023-08-08

## 2023-05-24 ASSESSMENT — ENCOUNTER SYMPTOMS
MUSCULOSKELETAL NEGATIVE: 1
COUGH: 0
FEVER: 0
PALPITATIONS: 0
CONSTITUTIONAL NEGATIVE: 1
SHORTNESS OF BREATH: 0
SPUTUM PRODUCTION: 0
NEUROLOGICAL NEGATIVE: 1

## 2023-05-24 ASSESSMENT — FIBROSIS 4 INDEX: FIB4 SCORE: 1.78

## 2023-05-24 NOTE — PROGRESS NOTES
Subjective       CC:   Chief Complaint   Patient presents with    Diabetes Mellitus        HPI:   Patient is a 67 y.o. established female patient with medical history listed below here today for evaluation and management of diabetes mellitus type 2. Accompanied by .     Problem   Obesity (Bmi 30-39.9)   Type 2 Diabetes Mellitus Without Complication, Without Long-Term Current Use of Insulin (H[facility])    Taking Metformin 1000mg BID, Januvia 50mg QD    GFR 83; microalb creat ratio <30  BP managed on Lisinopril 10mg QD  Dyslipidemia managed on Rosuvastatin 20mg   No neuropathy reported; normal monofilament today  [facility]inal Screen[facility] no retinopathy; concern for cup progression; get records for 2023           Patient Active Problem List   Diagnosis    Psoriatic arthritis (HCC)    Psoriasis    Colon polyp    Osteopenia    Hypertriglyceridemia    Type 2 diabetes mellitus without complication, without long-term current use of insulin (HCC)    Essential hypertension    Well woman exam with routine gynecological exam    Obesity (BMI 30-39.9)    Toe pain, bilateral    Microcytosis       Past Medical History:   Diagnosis Date    Diabetes (HCC)     Hyperplastic colon polyp 8/30/13    Hypertension     Hypertriglyceridemia 5/9/2013    Osteopenia     Psoriasis     Psoriatic arthritis (HCC)         Past Surgical History:   Procedure Laterality Date    COLONOSCOPY  08/30/13    CATARACT EXTRACTION WITH IOL      TONSILLECTOMY          Current Outpatient Medications on File Prior to Visit   Medication Sig Dispense Refill    lisinopril (PRINIVIL) 10 MG Tab Take 1 Tablet by mouth every day.      rosuvastatin (CRESTOR) 20 MG Tab TAKE ONE TABLET BY MOUTH IN THE EVENING 90 Tablet 3    SITagliptin (JANUVIA) 50 MG Tab Take 1 Tablet by mouth every day. 90 Tablet 3    metformin (GLUCOPHAGE) 1000 MG tablet Take 1 Tablet by mouth 2 times a day with meals. 180 Tablet 3    lisinopril (PRINIVIL) 10 MG Tab TAKE ONE TABLET  "BY MOUTH ONE TIME DAILY 90 Tablet 3    ACCU-CHEK SMARTVIEW strip USE TO TEST BLOOD GLUCOSE LEVELS ONCE DAILY 50 Strip 6    ENBREL SURECLICK 50 MG/ML Solution Auto-injector       Blood Glucose Monitoring Suppl (ACCU-CHEK ADVANTAGE DIABETES) KIT Test blood sugar every morning. 1 Kit 0    Lancet Device MISC Test blood sugar every morning. 100 Each 4     No current facility-administered medications on file prior to visit.        Health Maintenance: Completed  - get retinal screen records    ROS:  Review of Systems   Constitutional: Negative.  Negative for fever and malaise/fatigue.   Respiratory:  Negative for cough, sputum production and shortness of breath.    Cardiovascular:  Negative for chest pain, palpitations and leg swelling.   Genitourinary: Negative.    Musculoskeletal: Negative.    Neurological: Negative.    Endo/Heme/Allergies: Negative.      Objective       Exam:  /70   Pulse 82   Temp 36.1 °C (97 °F) (Temporal)   Ht 1.448 m (4' 9\")   Wt 70.8 kg (156 lb)   SpO2 98%   BMI 33.76 kg/m²  Body mass index is 33.76 kg/m².    Physical Exam  Constitutional:       Appearance: Normal appearance.   Cardiovascular:      Rate and Rhythm: Normal rate and regular rhythm.      Pulses: Normal pulses.      Heart sounds: Normal heart sounds.   Pulmonary:      Effort: Pulmonary effort is normal.      Breath sounds: Normal breath sounds.   Musculoskeletal:      Right lower leg: No edema.      Left lower leg: No edema.   Skin:     General: Skin is warm and dry.   Neurological:      Mental Status: She is alert.       Assessment & Plan       67 y.o. female with the following -     Problem List Items Addressed This Visit       Type 2 diabetes mellitus without complication, without long-term current use of insulin (HCC) (Chronic)     Chronic; A1C goal <6.5  A1C 6.9 today in clinic (up from 6.1); eating donuts with ; she wants to work on diet first before we adjust medication.   - continue  Metformin 1000mg BID, " Januvia 50mg QD  - counseled on eliminating added sugars in dietary intake; cut down rice, add more steamed fresh vegetables for fullness  - recheck A1c in 3 months           Relevant Orders    POCT  A1C (Completed)    Obesity (BMI 30-39.9)    Relevant Orders    Patient identified as having weight management issue.  Appropriate orders and counseling given.     Educated in proper administration of medication(s) ordered today including safety, possible SE, risks, benefits, rationale and alternatives to therapy.     Return in about 3 months (around 8/24/2023) for DM2 mgt.    Please note that this dictation was created using voice recognition software. I have made every reasonable attempt to correct obvious errors, but I expect that there are errors of grammar and possibly content that I did not discover before finalizing the note.

## 2023-05-24 NOTE — ASSESSMENT & PLAN NOTE
Chronic; A1C goal <6.5  A1C 6.9 today in clinic (up from 6.1); eating donuts with ; she wants to work on diet first before we adjust medication.   - continue  Metformin 1000mg BID, Januvia 50mg QD  - counseled on eliminating added sugars in dietary intake; cut down rice, add more steamed fresh vegetables for fullness  - recheck A1c in 3 months

## 2023-06-14 NOTE — ASSESSMENT & PLAN NOTE
Latest Reference Range & Units 07/27/22 06:21   Cholesterol,Tot 100 - 199 mg/dL 100   Triglycerides 0 - 149 mg/dL 165 (H)   HDL >=40 mg/dL 47   LDL <100 mg/dL 20     Chronic; tolerating statin; no myalgia; hx DM2 on oral meds  - continue Rosuvastatin 20mg QHS  - heart healthy eating, increase fiber intake; stays active with walking  - monitor fasting lipid annually  
Chronic; A1C goal <6.5  A1C 6.1 on 12/30/2022 (down from 6.3)  - continue  Metformin 1000mg BID, Januvia 50mg QD  - counseled on eliminating added sugars in dietary intake; cut down rice, add more steamed fresh vegetables for fullness  - recheck A1c in 6 months  
Chronic; Goal BP < 130/80  BP today in clinic 112/68; no CP, palpitations, dizziness, edema reported today  GFR 96; creat 0.66  - continue Lisinopril 10mg QD  - monitor annual CMP/fasting lipid  
Chronic; Managed by Rheumatology/Dr Calvillo  Stable on Enbrel IJ  Uses tylenol for occasional joint pain  
[___] : Total Pregnancies: [unfilled]

## 2023-08-08 NOTE — ASSESSMENT & PLAN NOTE
08/08/23 0830   Patient Assessment/Suction   Level of Consciousness (AVPU) alert   Respiratory Effort Normal;Unlabored   Expansion/Accessory Muscles/Retractions expansion symmetric;no retractions;no use of accessory muscles   All Lung Fields Breath Sounds clear;equal bilaterally   Rhythm/Pattern, Respiratory depth regular;pattern regular;unlabored   Cough Frequency no cough   PRE-TX-O2   Device (Oxygen Therapy) room air   SpO2 95 %   Pulse Oximetry Type Intermittent   $ Pulse Oximetry - Multiple Charge Pulse Oximetry - Multiple   Pulse 63   Resp 16   Positioning Sitting in chair   Incentive Spirometer   $ Incentive Spirometer Charges preop instruction   Incentive Spirometer Predicted Level (mL) 2040   Administration (IS) instruction provided, initial   Number of Repetitions (IS) 10   Level Incentive Spirometer (mL) 3000   Patient Tolerance (IS) good        BP today in clinic 110/62; no CP, palpitations, dizziness  - continue Lisinopril 10mg QD  - monitor annual CMP (ordered today)

## 2023-08-12 ENCOUNTER — OFFICE VISIT (OUTPATIENT)
Dept: URGENT CARE | Facility: PHYSICIAN GROUP | Age: 68
End: 2023-08-12
Payer: MEDICARE

## 2023-08-12 VITALS
BODY MASS INDEX: 33.66 KG/M2 | WEIGHT: 156 LBS | HEIGHT: 57 IN | DIASTOLIC BLOOD PRESSURE: 68 MMHG | TEMPERATURE: 97.6 F | HEART RATE: 84 BPM | RESPIRATION RATE: 16 BRPM | OXYGEN SATURATION: 98 % | SYSTOLIC BLOOD PRESSURE: 120 MMHG

## 2023-08-12 DIAGNOSIS — R29.810 FACIAL DROOP: ICD-10-CM

## 2023-08-12 DIAGNOSIS — G51.0 BELL'S PALSY: ICD-10-CM

## 2023-08-12 PROCEDURE — 99214 OFFICE O/P EST MOD 30 MIN: CPT | Performed by: PHYSICIAN ASSISTANT

## 2023-08-12 PROCEDURE — 3074F SYST BP LT 130 MM HG: CPT | Performed by: PHYSICIAN ASSISTANT

## 2023-08-12 PROCEDURE — 3078F DIAST BP <80 MM HG: CPT | Performed by: PHYSICIAN ASSISTANT

## 2023-08-12 RX ORDER — PREDNISONE 20 MG/1
TABLET ORAL
Qty: 10 TABLET | Refills: 0 | Status: SHIPPED | OUTPATIENT
Start: 2023-08-12 | End: 2023-09-06

## 2023-08-12 RX ORDER — VALACYCLOVIR HYDROCHLORIDE 1 G/1
1000 TABLET, FILM COATED ORAL 3 TIMES DAILY
Qty: 21 TABLET | Refills: 0 | Status: SHIPPED | OUTPATIENT
Start: 2023-08-12 | End: 2023-08-19

## 2023-08-12 ASSESSMENT — ENCOUNTER SYMPTOMS: FACIAL SWELLING: 1

## 2023-08-12 ASSESSMENT — FIBROSIS 4 INDEX: FIB4 SCORE: 1.78

## 2023-08-12 NOTE — PROGRESS NOTES
"Subjective:   Kristie Farley is a 67 y.o. female who presents for Facial Swelling (Lt side, with headache x2d, noticed some drooping yesterday morning.)  This is a very pleasant 67-year-old female who presents with chief complaint of left facial drooping which was first noticed yesterday morning.  She reports difficulty closing her eye.  She reports some pain around the jaw.  She reports inability to raise her eyebrow.  She denies significant headache nausea vomiting dizziness speech or vision changes.  No unilateral weakness.  No balance disturbance.  Has not tried any medications at home.  Does report some mild change in hearing as well on the left..  Yesterday AM facial swelling on left with difficulty closing left eye  Left sided HA, changes in hearing started last pm  HA started last  pm            Review of Systems   HENT:  Positive for facial swelling.        Medications:  Accu-Chek Advantage Diabetes Kit  Accu-Chek SmartView Strp  Enbrel SureClick Soaj  Januvia Tabs  Lancet Device Misc  lisinopril Tabs  metformin  rosuvastatin Tabs    Allergies:             Patient has no known allergies.    Surgical History:         Past Surgical History:   Procedure Laterality Date    COLONOSCOPY  08/30/13    CATARACT EXTRACTION WITH IOL      TONSILLECTOMY         Past Social Hx:  Kristie Farley  reports that she has been smoking cigarettes. She has been smoking an average of .25 packs per day. She has never used smokeless tobacco. She reports that she does not drink alcohol and does not use drugs.     Past Family Hx:   Kristie Farley family history is not on file.       Problem list, medications, and allergies reviewed by myself today in Epic.     Objective:     /68   Pulse 84   Temp 36.4 °C (97.6 °F) (Temporal)   Resp 16   Ht 1.448 m (4' 9\")   Wt 70.8 kg (156 lb)   SpO2 98%   BMI 33.76 kg/m²     Physical Exam  Vitals and nursing note reviewed.   Constitutional:       General: She is not in acute distress.     Appearance: " She is not ill-appearing, toxic-appearing or diaphoretic.      Comments: This is a nontoxic-appearing patient in no apparent distress   HENT:      Head: Normocephalic.      Right Ear: Tympanic membrane normal.      Left Ear: Tympanic membrane normal.   Eyes:      General: No visual field deficit.     Extraocular Movements: Extraocular movements intact.      Pupils: Pupils are equal, round, and reactive to light.   Cardiovascular:      Rate and Rhythm: Normal rate and regular rhythm.      Pulses: Normal pulses.   Pulmonary:      Effort: Pulmonary effort is normal. No respiratory distress.   Musculoskeletal:      Cervical back: Normal range of motion and neck supple. No rigidity.   Skin:     General: Skin is warm.      Findings: No rash.   Neurological:      General: No focal deficit present.      Mental Status: She is alert and oriented to person, place, and time. Mental status is at baseline.      GCS: GCS eye subscore is 4. GCS verbal subscore is 5. GCS motor subscore is 6.      Cranial Nerves: Facial asymmetry present. No cranial nerve deficit or dysarthria.      Motor: Motor function is intact. No tremor, atrophy, abnormal muscle tone or pronator drift.      Coordination: Coordination is intact. Romberg sign negative. Coordination normal. Finger-Nose-Finger Test and Heel to Shin Test normal.      Gait: Gait normal.      Deep Tendon Reflexes: Reflexes are normal and symmetric. Reflexes normal. Babinski sign absent on the right side. Babinski sign absent on the left side.      Comments: There is obvious left-sided facial droop affecting the forehead eye and lower face.  Patient is unable to close the eye completely.  She is unable to raise her eyebrow on the left with smoothing of the forehead.  There is no pronator drift.  Pupils equal round active to light.  Finger-to-nose intact.  No abnormal coordination.  She is able to heel toe walk without difficulty.  No dysarthria is noted.  Speech is fluent.  Negative  Babinski.  Reflexes within normal limits.   Psychiatric:         Attention and Perception: Attention and perception normal.         Mood and Affect: Mood and affect normal.         Speech: Speech normal.         Behavior: Behavior normal.         Thought Content: Thought content normal.         Cognition and Memory: Cognition normal.         Assessment/Plan:     Diagnosis and Associated Orders:     1. Bell's palsy  - valacyclovir (VALTREX) 1 GM Tab; Take 1 Tablet by mouth 3 times a day for 7 days.  Dispense: 21 Tablet; Refill: 0  - predniSONE (DELTASONE) 20 MG Tab; Tab 2 po qd x 5 days  Dispense: 10 Tablet; Refill: 0    2. Facial droop        Comments/MDM:  Exam consistent with Bell's palsy with associated forehead and eye involvement.  Neuro exam otherwise without abnormalities.  No associated symptoms as listed above including no severe headache, nausea, vomiting, dizziness, loss of balance, speech/vision changes, unilateral weakness.  Complete  course of prednisone, diabetes noted, monitor blood glucose  Complete antiviral therapy, recommend valacyclovir 1000 mg 3 times daily x7 days    Did discuss differential diagnosis includes stroke.  However given forehead involvement, lack of other associated findings, overnight onset at this time feel symptoms most likely Bell's palsy.  Did discuss indications as below in regards to strict ED precautions.  -Do not take any other NSAIDs with medrol pack use  -Monitor for facial numbness that accompanies other symptoms such as sudden severe HA, blurry vision, change in mental status, one sided body weakness in extremities, weakness, inability to speak clearly- call 911 immediately. Patient understands this.  -Follow up with PCP prn after steroid use, facial numbness may continue after finishing steroid    Eye care includes the following:  ?Waking hours - Patients should use artificial tear drops (liquid or gel) four times daily and up to hourly if needed. Gel formulations  "may cause transient blurry vision when applied. Artificial tears are available over the counter. Formulations with preservatives are safe for use four times a day, but preservative-free formulations are probably safer for more frequent application. (See \"Dry eye disease\", section on 'Artificial tears'.)  Protective glasses or goggles can be worn to physically protect the eye from external trauma. Some patients may prefer to tape the eyelid closed during the day to prevent exposure, using the same procedure as described for sleep. Patches are generally not recommended because there is a tendency for the eye to open under the patch and expose the cornea.  Patients with refractory eye dryness and exposure symptoms should be referred to ophthalmology for discussion of additional options such as a scleral lens, adhesive moisture chambers, temporary-suture tarsorrhaphy, and gold or platinum eyelid weighting [13]. (See 'Eyelid weights and tarsorrhaphy' below.)  ?During sleep - Overnight and during naps, an ointment formulation of artificial tears should be applied to further protect the eye when it is most vulnerable. In addition, the eye can be carefully taped shut using a medical-grade waterproof transparent dressing or tape [13]. The eye should be examined to make sure the eyelid remains completely closed under the dressing. Patching without taping is not generally recommended because the cornea remains exposed under the patch.   I personally reviewed prior external notes and test results pertinent to today's visit. Supportive care, natural history, differential diagnoses, and indications for immediate follow-up discussed. Return to clinic or go to ED if symptoms worsen or persist.  Red flag symptoms discussed.  Patient/Parent/Guardian voices understanding. Follow-up with your primary care provider in 3-5 days.  All side effects of medication discussed including allergic response, GI upset, tendon injury, rash, sedation " etc    Please note that this dictation was created using voice recognition software. I have made a reasonable attempt to correct obvious errors, but I expect that there are errors of grammar and possibly content that I did not discover before finalizing the note.    This note was electronically signed by Divya Gomez PA-C

## 2023-09-05 RX ORDER — ATORVASTATIN CALCIUM 10 MG/1
TABLET, FILM COATED ORAL
COMMUNITY
End: 2023-09-05

## 2023-09-06 ENCOUNTER — OFFICE VISIT (OUTPATIENT)
Dept: MEDICAL GROUP | Facility: PHYSICIAN GROUP | Age: 68
End: 2023-09-06
Payer: MEDICARE

## 2023-09-06 VITALS
SYSTOLIC BLOOD PRESSURE: 114 MMHG | RESPIRATION RATE: 16 BRPM | TEMPERATURE: 97.7 F | OXYGEN SATURATION: 97 % | HEART RATE: 76 BPM | WEIGHT: 153 LBS | DIASTOLIC BLOOD PRESSURE: 74 MMHG | HEIGHT: 57 IN | BODY MASS INDEX: 33.01 KG/M2

## 2023-09-06 DIAGNOSIS — E11.9 TYPE 2 DIABETES MELLITUS WITHOUT COMPLICATION, WITHOUT LONG-TERM CURRENT USE OF INSULIN (HCC): Chronic | ICD-10-CM

## 2023-09-06 LAB
HBA1C MFR BLD: 6.6 % (ref ?–5.8)
POCT INT CON NEG: NEGATIVE
POCT INT CON POS: POSITIVE

## 2023-09-06 PROCEDURE — 3078F DIAST BP <80 MM HG: CPT | Performed by: NURSE PRACTITIONER

## 2023-09-06 PROCEDURE — 83036 HEMOGLOBIN GLYCOSYLATED A1C: CPT | Performed by: NURSE PRACTITIONER

## 2023-09-06 PROCEDURE — 3074F SYST BP LT 130 MM HG: CPT | Performed by: NURSE PRACTITIONER

## 2023-09-06 PROCEDURE — 99214 OFFICE O/P EST MOD 30 MIN: CPT | Performed by: NURSE PRACTITIONER

## 2023-09-06 ASSESSMENT — ENCOUNTER SYMPTOMS
FEVER: 0
SHORTNESS OF BREATH: 0
COUGH: 0
NEUROLOGICAL NEGATIVE: 1
MUSCULOSKELETAL NEGATIVE: 1
PALPITATIONS: 0
SPUTUM PRODUCTION: 0
CONSTITUTIONAL NEGATIVE: 1

## 2023-09-06 ASSESSMENT — FIBROSIS 4 INDEX: FIB4 SCORE: 1.78

## 2023-09-06 NOTE — PATIENT INSTRUCTIONS
Take Miralax and benefiber once a day for constipation; continue daily for 1-3 days until you have good bowel movement.   After this maintain good bowel movement with vegetables and fruits.

## 2023-09-06 NOTE — ASSESSMENT & PLAN NOTE
Chronic; A1C goal <6.5  A1C 6.6 today in clinic (down from 6.9); she has cut out donuts. She would like to continue working diet first before we adjust medication.   - continue  Metformin 1000mg BID, Januvia 50mg QD  - counseled on eliminating added sugars in dietary intake; cut down rice, add more steamed fresh vegetables for fullness  - recheck A1c in 3 months

## 2023-09-06 NOTE — PROGRESS NOTES
Subjective       CC:   Chief Complaint   Patient presents with    Diabetes Follow-up    Abdominal Pain     Cramps on L side, urge to poop, denies NVD        HPI:   Patient is a 67 y.o. established female patient with medical history listed below here today for evaluation and management of diabetes mellitus type 2. Accompanied by her  Danish today.  She is feeling constipated the past few days. She will increase fiber intake and try some prunes. Can also take prn miralax if needed.   She was seen in UC on 8/12/2023 for facial droop related to bells palsy. Treated with valtrex + prednisone; states symptoms resolved same day.     Problem   Type 2 Diabetes Mellitus Without Complication, Without Long-Term Current Use of Insulin (Hcc)    Taking Metformin 1000mg BID, Januvia 50mg QD    GFR 83; microalb creat ratio <30  BP managed on Lisinopril 10mg QD  Dyslipidemia managed on Rosuvastatin 20mg   No neuropathy reported; normal monofilament today  [facility]inal Screen[facility], no retinopathy; concern for cup progression; get records for 2023         Patient Active Problem List   Diagnosis    Psoriatic arthritis (HCC)    Psoriasis    Colon polyp    Osteopenia    Hypertriglyceridemia    Type 2 diabetes mellitus without complication, without long-term current use of insulin (HCC)    Essential hypertension    Well woman exam with routine gynecological exam    Obesity (BMI 30-39.9)    Toe pain, bilateral    Microcytosis       Past Medical History:   Diagnosis Date    Diabetes (HCC)     Hyperplastic colon polyp 8/30/13    Hypertension     Hypertriglyceridemia 5/9/2013    Osteopenia     Psoriasis     Psoriatic arthritis (HCC)         Past Surgical History:   Procedure Laterality Date    COLONOSCOPY  08/30/13    CATARACT EXTRACTION WITH IOL      TONSILLECTOMY          Current Outpatient Medications on File Prior to Visit   Medication Sig Dispense Refill    TRINA ASPIRIN PO Trina Aspirin      Calcium  "Carb-Cholecalciferol (CALCIUM 1000 + D PO) Calcium      lisinopril (PRINIVIL) 10 MG Tab TAKE ONE TABLET BY MOUTH ONE TIME DAILY 90 Tablet 3    metformin (GLUCOPHAGE) 1000 MG tablet TAKE 1 TABLET BY MOUTH 2 TIMES A DAY WITH MEALS 180 Tablet 3    JANUVIA 50 MG Tab Take 1 Tablet by mouth every day. 90 Tablet 3    rosuvastatin (CRESTOR) 20 MG Tab TAKE ONE TABLET BY MOUTH IN THE EVENING 90 Tablet 3    ACCU-CHEK SMARTVIEW strip USE TO TEST BLOOD GLUCOSE LEVELS ONCE DAILY 50 Strip 6    ENBREL SURECLICK 50 MG/ML Solution Auto-injector       Blood Glucose Monitoring Suppl (ACCU-CHEK ADVANTAGE DIABETES) KIT Test blood sugar every morning. 1 Kit 0    Lancet Device MISC Test blood sugar every morning. 100 Each 4     No current facility-administered medications on file prior to visit.        Health Maintenance: Completed  Declined bone density, recall colonoscopy for now    ROS:  Review of Systems   Constitutional: Negative.  Negative for fever and malaise/fatigue.   Respiratory:  Negative for cough, sputum production and shortness of breath.    Cardiovascular:  Negative for chest pain, palpitations and leg swelling.   Genitourinary: Negative.    Musculoskeletal: Negative.    Neurological: Negative.    Endo/Heme/Allergies: Negative.        Objective       Exam:  /74   Pulse 76   Temp 36.5 °C (97.7 °F) (Temporal)   Resp 16   Ht 1.448 m (4' 9\")   Wt 69.4 kg (153 lb)   SpO2 97%   BMI 33.11 kg/m²  Body mass index is 33.11 kg/m².    Physical Exam  Constitutional:       Appearance: Normal appearance.   Cardiovascular:      Rate and Rhythm: Normal rate and regular rhythm.      Pulses: Normal pulses.      Heart sounds: Normal heart sounds.   Pulmonary:      Effort: Pulmonary effort is normal.      Breath sounds: Normal breath sounds.   Musculoskeletal:      Right lower leg: No edema.      Left lower leg: No edema.   Skin:     General: Skin is warm and dry.   Neurological:      Mental Status: She is alert.       Monofilament " testing with a 10 gram force: sensation intact: intact bilaterally  Visual Inspection: Feet without maceration, ulcers, fissures.  Pedal pulses: intact bilaterally      Assessment & Plan       67 y.o. female with the following -     Problem List Items Addressed This Visit       Type 2 diabetes mellitus without complication, without long-term current use of insulin (HCC) (Chronic)     Chronic; A1C goal <6.5  A1C 6.6 today in clinic (down from 6.9); she has cut out donuts. She would like to continue working diet first before we adjust medication.   - continue  Metformin 1000mg BID, Januvia 50mg QD  - counseled on eliminating added sugars in dietary intake; cut down rice, add more steamed fresh vegetables for fullness  - recheck A1c in 3 months         Relevant Orders    POCT Hemoglobin A1C (Completed)    CBC WITHOUT DIFFERENTIAL    Comp Metabolic Panel    HEMOGLOBIN A1C    Lipid Profile    MICROALBUMIN CREAT RATIO URINE    TSH WITH REFLEX TO FT4    Diabetic Monofilament LE Exam (Completed)     Educated in proper administration of medication(s) ordered today including safety, possible SE, risks, benefits, rationale and alternatives to therapy.     Return in about 3 months (around 12/6/2023) for dm2 mgt.    Please note that this dictation was created using voice recognition software. I have made every reasonable attempt to correct obvious errors, but I expect that there are errors of grammar and possibly content that I did not discover before finalizing the note.

## 2023-11-01 ENCOUNTER — APPOINTMENT (OUTPATIENT)
Dept: TELEHEALTH | Facility: TELEMEDICINE | Age: 68
End: 2023-11-01
Payer: MEDICARE

## 2023-11-02 ENCOUNTER — OFFICE VISIT (OUTPATIENT)
Dept: MEDICAL GROUP | Facility: PHYSICIAN GROUP | Age: 68
End: 2023-11-02
Payer: MEDICARE

## 2023-11-02 ENCOUNTER — HOSPITAL ENCOUNTER (OUTPATIENT)
Facility: MEDICAL CENTER | Age: 68
End: 2023-11-02
Attending: NURSE PRACTITIONER
Payer: MEDICARE

## 2023-11-02 VITALS
BODY MASS INDEX: 33.01 KG/M2 | RESPIRATION RATE: 14 BRPM | WEIGHT: 153 LBS | DIASTOLIC BLOOD PRESSURE: 72 MMHG | SYSTOLIC BLOOD PRESSURE: 120 MMHG | HEIGHT: 57 IN | OXYGEN SATURATION: 97 % | HEART RATE: 93 BPM | TEMPERATURE: 97 F

## 2023-11-02 DIAGNOSIS — N30.00 ACUTE CYSTITIS WITHOUT HEMATURIA: ICD-10-CM

## 2023-11-02 DIAGNOSIS — R30.0 DYSURIA: ICD-10-CM

## 2023-11-02 LAB
APPEARANCE UR: NORMAL
BILIRUB UR STRIP-MCNC: NEGATIVE MG/DL
COLOR UR AUTO: NORMAL
GLUCOSE UR STRIP.AUTO-MCNC: NEGATIVE MG/DL
KETONES UR STRIP.AUTO-MCNC: NEGATIVE MG/DL
LEUKOCYTE ESTERASE UR QL STRIP.AUTO: NORMAL
NITRITE UR QL STRIP.AUTO: NEGATIVE
PH UR STRIP.AUTO: 7 [PH] (ref 5–8)
PROT UR QL STRIP: NEGATIVE MG/DL
RBC UR QL AUTO: NORMAL
SP GR UR STRIP.AUTO: 1.01
UROBILINOGEN UR STRIP-MCNC: 0.2 MG/DL

## 2023-11-02 PROCEDURE — 3074F SYST BP LT 130 MM HG: CPT | Performed by: NURSE PRACTITIONER

## 2023-11-02 PROCEDURE — 99213 OFFICE O/P EST LOW 20 MIN: CPT | Performed by: NURSE PRACTITIONER

## 2023-11-02 PROCEDURE — 3078F DIAST BP <80 MM HG: CPT | Performed by: NURSE PRACTITIONER

## 2023-11-02 PROCEDURE — 87086 URINE CULTURE/COLONY COUNT: CPT

## 2023-11-02 PROCEDURE — 81002 URINALYSIS NONAUTO W/O SCOPE: CPT | Performed by: NURSE PRACTITIONER

## 2023-11-02 RX ORDER — SULFAMETHOXAZOLE AND TRIMETHOPRIM 800; 160 MG/1; MG/1
1 TABLET ORAL 2 TIMES DAILY
Qty: 6 TABLET | Refills: 0 | Status: SHIPPED | OUTPATIENT
Start: 2023-11-02 | End: 2023-11-05

## 2023-11-02 ASSESSMENT — FIBROSIS 4 INDEX: FIB4 SCORE: 1.81

## 2023-11-02 NOTE — PROGRESS NOTES
Chief Complaint   Patient presents with    UTI     Low back pain, chills 2 weeks, pain, burning when peeing, no OTCs       HISTORY OF PRESENT ILLNESS: Kristie Farley is a 68 y.o. female established patient who presents today to discuss:  - flank pain, chills, dysuria x 2 weeks  - denies fever, cough, abdominal pain, nausea or vomiting. States she has regular daily BM, no rectal bleed or constipation or diarrhea.     Current Outpatient Medications on File Prior to Visit   Medication Sig Dispense Refill    TRINA ASPIRIN PO Trina Aspirin      Calcium Carb-Cholecalciferol (CALCIUM 1000 + D PO) Calcium      lisinopril (PRINIVIL) 10 MG Tab TAKE ONE TABLET BY MOUTH ONE TIME DAILY 90 Tablet 3    metformin (GLUCOPHAGE) 1000 MG tablet TAKE 1 TABLET BY MOUTH 2 TIMES A DAY WITH MEALS 180 Tablet 3    JANUVIA 50 MG Tab Take 1 Tablet by mouth every day. 90 Tablet 3    rosuvastatin (CRESTOR) 20 MG Tab TAKE ONE TABLET BY MOUTH IN THE EVENING 90 Tablet 3    ACCU-CHEK SMARTVIEW strip USE TO TEST BLOOD GLUCOSE LEVELS ONCE DAILY 50 Strip 6    ENBREL SURECLICK 50 MG/ML Solution Auto-injector       Blood Glucose Monitoring Suppl (ACCU-CHEK ADVANTAGE DIABETES) KIT Test blood sugar every morning. 1 Kit 0    Lancet Device MISC Test blood sugar every morning. 100 Each 4     No current facility-administered medications on file prior to visit.       has a past medical history of Diabetes (HCC), Hyperplastic colon polyp (8/30/13), Hypertension, Hypertriglyceridemia (5/9/2013), Osteopenia, Psoriasis, and Psoriatic arthritis (HCC).     Patient Active Problem List   Diagnosis    Psoriatic arthritis (HCC)    Psoriasis    Colon polyp    Osteopenia    Hypertriglyceridemia    Type 2 diabetes mellitus without complication, without long-term current use of insulin (HCC)    Essential hypertension    Well woman exam with routine gynecological exam    Obesity (BMI 30-39.9)    Toe pain, bilateral    Microcytosis        Allergies:Patient has no known  "allergies.    Health Maintenance: deferred  Review of Systems -included above  Exam:   /72   Pulse 93   Temp 36.1 °C (97 °F) (Temporal)   Resp 14   Ht 1.448 m (4' 9\")   Wt 69.4 kg (153 lb)   SpO2 97%   Body mass index is 33.11 kg/m².   Physical Exam  Constitutional:       Appearance: Normal appearance.   Cardiovascular:      Rate and Rhythm: Normal rate and regular rhythm.      Pulses: Normal pulses.      Heart sounds: Normal heart sounds.   Pulmonary:      Effort: Pulmonary effort is normal.      Breath sounds: Normal breath sounds.   Abdominal:      Tenderness: There is right CVA tenderness and left CVA tenderness.   Skin:     General: Skin is warm and dry.   Neurological:      Mental Status: She is alert.        Latest Reference Range & Units 11/02/23 09:44   POC Color Negative  Sobia   POC Appearance Negative  Slightly Cloudy   POC Specific Gravity <1.005 - >1.030  1.015   POC Urine PH 5.0 - 8.0  7.0   POC Glucose Negative mg/dL Negative   POC Ketones Negative mg/dL Negative   POC Protein Negative mg/dL Negative   POC Nitrites Negative  Negative   POC Leukocyte Esterase Negative  Trace   POC Blood Negative  Trace-intact   POC Bilirubin Negative mg/dL Negative   POC Urobiligen Negative (0.2) mg/dL 0.2       Assessment/Plan:  1. Dysuria  - POCT Urinalysis  - URINE CULTURE(NEW); Future  2. Acute cystitis without hematuria  67 y/o F with dysuria, flank pain x 2 weeks; cloudy urine with trace leuk on UA today. Will send out urine culture and start treatment with bactrim for her symptoms. Encouraged hydration, wipe front to back. [facility]    - sulfamethoxazole-trimethoprim (BACTRIM DS) 800-160 MG tablet; Take 1 Tablet by mouth 2 times a day for 3 days.  Dispense: 6 Tablet; Refill: 0  - URINE CULTURE(NEW); Future     Follow up:  Return if symptoms worsen or fail to improve.    Educated in proper administration of medication(s) ordered today including safety, possible SE, risks, " benefits, rationale and alternatives to therapy.       Please note that this dictation was created using voice recognition software. I have made every reasonable attempt to correct obvious errors, but I expect that there are errors of grammar and possibly content that I did not discover before finalizing the note.

## 2023-11-04 LAB
BACTERIA UR CULT: NORMAL
SIGNIFICANT IND 70042: NORMAL
SITE SITE: NORMAL
SOURCE SOURCE: NORMAL

## 2023-12-02 ENCOUNTER — HOSPITAL ENCOUNTER (OUTPATIENT)
Dept: LAB | Facility: MEDICAL CENTER | Age: 68
End: 2023-12-02
Attending: NURSE PRACTITIONER
Payer: MEDICARE

## 2023-12-02 DIAGNOSIS — E11.9 TYPE 2 DIABETES MELLITUS WITHOUT COMPLICATION, WITHOUT LONG-TERM CURRENT USE OF INSULIN (HCC): Chronic | ICD-10-CM

## 2023-12-02 LAB
ALBUMIN SERPL BCP-MCNC: 4.8 G/DL (ref 3.2–4.9)
ALBUMIN/GLOB SERPL: 1.5 G/DL
ALP SERPL-CCNC: 53 U/L (ref 30–99)
ALT SERPL-CCNC: 7 U/L (ref 2–50)
ANION GAP SERPL CALC-SCNC: 14 MMOL/L (ref 7–16)
AST SERPL-CCNC: 15 U/L (ref 12–45)
BILIRUB SERPL-MCNC: 0.8 MG/DL (ref 0.1–1.5)
BUN SERPL-MCNC: 12 MG/DL (ref 8–22)
CALCIUM ALBUM COR SERPL-MCNC: 9.3 MG/DL (ref 8.5–10.5)
CALCIUM SERPL-MCNC: 9.9 MG/DL (ref 8.5–10.5)
CHLORIDE SERPL-SCNC: 103 MMOL/L (ref 96–112)
CHOLEST SERPL-MCNC: 110 MG/DL (ref 100–199)
CO2 SERPL-SCNC: 25 MMOL/L (ref 20–33)
CREAT SERPL-MCNC: 0.72 MG/DL (ref 0.5–1.4)
CREAT UR-MCNC: 83.29 MG/DL
ERYTHROCYTE [DISTWIDTH] IN BLOOD BY AUTOMATED COUNT: 36 FL (ref 35.9–50)
EST. AVERAGE GLUCOSE BLD GHB EST-MCNC: 128 MG/DL
FASTING STATUS PATIENT QL REPORTED: NORMAL
GFR SERPLBLD CREATININE-BSD FMLA CKD-EPI: 91 ML/MIN/1.73 M 2
GLOBULIN SER CALC-MCNC: 3.3 G/DL (ref 1.9–3.5)
GLUCOSE SERPL-MCNC: 138 MG/DL (ref 65–99)
HBA1C MFR BLD: 6.1 % (ref 4–5.6)
HCT VFR BLD AUTO: 40.6 % (ref 37–47)
HDLC SERPL-MCNC: 51 MG/DL
HGB BLD-MCNC: 13.4 G/DL (ref 12–16)
LDLC SERPL CALC-MCNC: 26 MG/DL
MCH RBC QN AUTO: 22.3 PG (ref 27–33)
MCHC RBC AUTO-ENTMCNC: 33 G/DL (ref 32.2–35.5)
MCV RBC AUTO: 67.6 FL (ref 81.4–97.8)
MICROALBUMIN UR-MCNC: <1.2 MG/DL
MICROALBUMIN/CREAT UR: NORMAL MG/G (ref 0–30)
PLATELET # BLD AUTO: 231 K/UL (ref 164–446)
PMV BLD AUTO: 11.1 FL (ref 9–12.9)
POTASSIUM SERPL-SCNC: 4.3 MMOL/L (ref 3.6–5.5)
PROT SERPL-MCNC: 8.1 G/DL (ref 6–8.2)
RBC # BLD AUTO: 6.01 M/UL (ref 4.2–5.4)
SODIUM SERPL-SCNC: 142 MMOL/L (ref 135–145)
TRIGL SERPL-MCNC: 165 MG/DL (ref 0–149)
TSH SERPL DL<=0.005 MIU/L-ACNC: 0.72 UIU/ML (ref 0.38–5.33)
WBC # BLD AUTO: 6.9 K/UL (ref 4.8–10.8)

## 2023-12-02 PROCEDURE — 84443 ASSAY THYROID STIM HORMONE: CPT

## 2023-12-02 PROCEDURE — 80061 LIPID PANEL: CPT

## 2023-12-02 PROCEDURE — 83036 HEMOGLOBIN GLYCOSYLATED A1C: CPT | Mod: GA

## 2023-12-02 PROCEDURE — 36415 COLL VENOUS BLD VENIPUNCTURE: CPT

## 2023-12-02 PROCEDURE — 82043 UR ALBUMIN QUANTITATIVE: CPT

## 2023-12-02 PROCEDURE — 80053 COMPREHEN METABOLIC PANEL: CPT

## 2023-12-02 PROCEDURE — 82570 ASSAY OF URINE CREATININE: CPT

## 2023-12-02 PROCEDURE — 85027 COMPLETE CBC AUTOMATED: CPT

## 2023-12-12 ENCOUNTER — OFFICE VISIT (OUTPATIENT)
Dept: MEDICAL GROUP | Facility: PHYSICIAN GROUP | Age: 68
End: 2023-12-12
Payer: MEDICARE

## 2023-12-12 VITALS
OXYGEN SATURATION: 99 % | DIASTOLIC BLOOD PRESSURE: 64 MMHG | TEMPERATURE: 96.9 F | BODY MASS INDEX: 31.7 KG/M2 | HEIGHT: 58 IN | HEART RATE: 83 BPM | SYSTOLIC BLOOD PRESSURE: 126 MMHG | WEIGHT: 151 LBS

## 2023-12-12 DIAGNOSIS — Z12.31 ENCOUNTER FOR SCREENING MAMMOGRAM FOR MALIGNANT NEOPLASM OF BREAST: ICD-10-CM

## 2023-12-12 DIAGNOSIS — Z12.11 COLON CANCER SCREENING: ICD-10-CM

## 2023-12-12 DIAGNOSIS — E78.1 HYPERTRIGLYCERIDEMIA: Chronic | ICD-10-CM

## 2023-12-12 DIAGNOSIS — E11.9 TYPE 2 DIABETES MELLITUS WITHOUT COMPLICATION, WITHOUT LONG-TERM CURRENT USE OF INSULIN (HCC): Chronic | ICD-10-CM

## 2023-12-12 DIAGNOSIS — I10 ESSENTIAL HYPERTENSION: Chronic | ICD-10-CM

## 2023-12-12 DIAGNOSIS — L40.50 PSORIATIC ARTHRITIS (HCC): Chronic | ICD-10-CM

## 2023-12-12 PROCEDURE — 3078F DIAST BP <80 MM HG: CPT | Performed by: NURSE PRACTITIONER

## 2023-12-12 PROCEDURE — 3074F SYST BP LT 130 MM HG: CPT | Performed by: NURSE PRACTITIONER

## 2023-12-12 PROCEDURE — 99214 OFFICE O/P EST MOD 30 MIN: CPT | Performed by: NURSE PRACTITIONER

## 2023-12-12 ASSESSMENT — ENCOUNTER SYMPTOMS
SPUTUM PRODUCTION: 0
MUSCULOSKELETAL NEGATIVE: 1
COUGH: 0
CONSTITUTIONAL NEGATIVE: 1
PALPITATIONS: 0
SHORTNESS OF BREATH: 0
FEVER: 0
NEUROLOGICAL NEGATIVE: 1

## 2023-12-12 ASSESSMENT — FIBROSIS 4 INDEX: FIB4 SCORE: 1.67

## 2023-12-12 NOTE — ASSESSMENT & PLAN NOTE
Chronic; Managed by Rheumatology/Dr Rajinder Capps on Enbrel IJ  Uses tylenol for occasional joint pain  Sees [facility] annually

## 2023-12-12 NOTE — PROGRESS NOTES
Subjective       CC:   Chief Complaint   Patient presents with    Lab Results        HPI:   Patient is a 68 y.o. established female patient with medical history listed below here today for evaluation and management of diabetes mellitus type 2, hypertriglyceridemia, hypertension.  Accompanied by her  Danish today.     Problem   Essential Hypertension    Taking Lisinopril 10mg QD     Type 2 Diabetes Mellitus Without Complication, Without Long-Term Current Use of Insulin (Hcc)    Taking Metformin 1000mg BID, Januvia 50mg QD    GFR 83; microalb creat ratio <30  BP managed on Lisinopril 10mg QD  Dyslipidemia managed on Rosuvastatin 20mg   No neuropathy reported; normal monofilament today  [facility]inal Screen[facility], no retinopathy; concern for cup progression; get records for 2023     Hypertriglyceridemia    Taking Rosuvastatin 20mg QHS     Psoriatic Arthritis (Hcc)       Patient Active Problem List   Diagnosis    Psoriatic arthritis (HCC)    Psoriasis    Colon polyp    Osteopenia    Hypertriglyceridemia    Type 2 diabetes mellitus without complication, without long-term current use of insulin (HCC)    Essential hypertension    Well woman exam with routine gynecological exam    Obesity (BMI 30-39.9)    Toe pain, bilateral    Microcytosis       Past Medical History:   Diagnosis Date    Diabetes (HCC)     Hyperplastic colon polyp 8/30/13    Hypertension     Hypertriglyceridemia 5/9/2013    Osteopenia     Psoriasis     Psoriatic arthritis (HCC)         Past Surgical History:   Procedure Laterality Date    COLONOSCOPY  08/30/13    CATARACT EXTRACTION WITH IOL      TONSILLECTOMY          Current Outpatient Medications on File Prior to Visit   Medication Sig Dispense Refill    TRINA ASPIRIN PO Trina Aspirin      Calcium Carb-Cholecalciferol (CALCIUM 1000 + D PO) Calcium      lisinopril (PRINIVIL) 10 MG Tab TAKE ONE TABLET BY MOUTH ONE TIME DAILY 90 Tablet 3    metformin (GLUCOPHAGE) 1000 MG tablet TAKE  "1 TABLET BY MOUTH 2 TIMES A DAY WITH MEALS 180 Tablet 3    JANUVIA 50 MG Tab Take 1 Tablet by mouth every day. 90 Tablet 3    rosuvastatin (CRESTOR) 20 MG Tab TAKE ONE TABLET BY MOUTH IN THE EVENING 90 Tablet 3    ACCU-CHEK SMARTVIEW strip USE TO TEST BLOOD GLUCOSE LEVELS ONCE DAILY 50 Strip 6    ENBREL SURECLICK 50 MG/ML Solution Auto-injector       Blood Glucose Monitoring Suppl (ACCU-CHEK ADVANTAGE DIABETES) KIT Test blood sugar every morning. 1 Kit 0    Lancet Device MISC Test blood sugar every morning. 100 Each 4     No current facility-administered medications on file prior to visit.        Health Maintenance: Completed    ROS:  Review of Systems   Constitutional: Negative.  Negative for fever and malaise/fatigue.   Respiratory:  Negative for cough, sputum production and shortness of breath.    Cardiovascular:  Negative for chest pain, palpitations and leg swelling.   Genitourinary: Negative.    Musculoskeletal: Negative.    Neurological: Negative.    Endo/Heme/Allergies: Negative.      Objective       Exam:  /64 (BP Location: Left arm, Patient Position: Sitting, BP Cuff Size: Adult)   Pulse 83   Temp 36.1 °C (96.9 °F) (Temporal)   Ht 1.461 m (4' 9.5\")   Wt 68.5 kg (151 lb)   SpO2 99%   BMI 32.11 kg/m²  Body mass index is 32.11 kg/m².    Physical Exam  Constitutional:       Appearance: Normal appearance.   Neurological:      Mental Status: She is alert.         Labs: reviewed with patient; questions answered    Assessment & Plan       68 y.o. female with the following -     Problem List Items Addressed This Visit       Psoriatic arthritis (HCC) (Chronic)     Chronic; Managed by Rheumatology/Dr Rajinder Capps on Enbrel IJ  Uses tylenol for occasional joint pain  [facility]         Hypertriglyceridemia (Chronic)      Latest Reference Range & Units 07/27/22 06:21 12/02/23 07:46   Cholesterol,Tot 100 - 199 mg/dL 100 110   Triglycerides 0 - 149 mg/dL 165 (H) 165 (H)   HDL >=40 " mg/dL 47 51   LDL <100 mg/dL 20 26   Chronic; LDL goal < 100; tolerating statin; no myalgia; hx hypertension, DM2 on oral meds  - continue Rosuvastatin 20mg QHS  - heart healthy eating, increase fiber intake; stays active with walking  - monitor fasting lipid annually         Type 2 diabetes mellitus without complication, without long-term current use of insulin (HCC) (Chronic)     Chronic; A1C goal <6.5  A1C at 6.1 with labs on 12/2/203 (down from 6.6); she has cut out donuts. She would like to continue working diet first before we adjust medication.   - continue  Metformin 1000mg BID, Januvia 50mg QD  - counseled on eliminating added sugars in dietary intake; cut down rice, add more steamed fresh vegetables for fullness  - recheck A1c in 3-6 months         Essential hypertension (Chronic)     Chronic; Goal BP < 130/80  BP today in clinic 112/68; no CP, palpitations, dizziness, edema reported today  GFR 91; creat 0.72  - continue Lisinopril 10mg QD  - monitor annual CMP/fasting lipid          Other Visit Diagnoses       Encounter for screening mammogram for malignant neoplasm of breast        Relevant Orders    MA-SCREENING MAMMO BILAT W/TOMOSYNTHESIS W/CAD    Colon cancer screening        Relevant Orders    Referral to GI for Colonoscopy            Educated in proper administration of medication(s) ordered today including safety, possible SE, risks, benefits, rationale and alternatives to therapy.     Return in about 6 months (around 6/12/2024) for Medicare Annual Visit.    Please note that this dictation was created using voice recognition software. I have made every reasonable attempt to correct obvious errors, but I expect that there are errors of grammar and possibly content that I did not discover before finalizing the note.

## 2023-12-12 NOTE — ASSESSMENT & PLAN NOTE
Latest Reference Range & Units 07/27/22 06:21 12/02/23 07:46   Cholesterol,Tot 100 - 199 mg/dL 100 110   Triglycerides 0 - 149 mg/dL 165 (H) 165 (H)   HDL >=40 mg/dL 47 51   LDL <100 mg/dL 20 26     Chronic; LDL goal < 100; tolerating statin; no myalgia; hx hypertension, DM2 on oral meds  - continue Rosuvastatin 20mg QHS  - heart healthy eating, increase fiber intake; stays active with walking  - monitor fasting lipid annually

## 2023-12-12 NOTE — ASSESSMENT & PLAN NOTE
Chronic; Goal BP < 130/80  BP today in clinic 112/68; no CP, palpitations, dizziness, edema reported today  GFR 91; creat 0.72  - continue Lisinopril 10mg QD  - monitor annual CMP/fasting lipid

## 2023-12-12 NOTE — ASSESSMENT & PLAN NOTE
Chronic; A1C goal <6.5  A1C at 6.1 with labs on 12/2/203 (down from 6.6); she has cut out donuts. She would like to continue working diet first before we adjust medication.   - continue  Metformin 1000mg BID, Januvia 50mg QD  - counseled on eliminating added sugars in dietary intake; cut down rice, add more steamed fresh vegetables for fullness  - recheck A1c in 3-6 months

## 2024-03-04 ENCOUNTER — HOSPITAL ENCOUNTER (OUTPATIENT)
Facility: MEDICAL CENTER | Age: 69
End: 2024-03-04
Attending: NURSE PRACTITIONER
Payer: MEDICARE

## 2024-03-04 ENCOUNTER — OFFICE VISIT (OUTPATIENT)
Dept: MEDICAL GROUP | Facility: PHYSICIAN GROUP | Age: 69
End: 2024-03-04
Payer: MEDICARE

## 2024-03-04 VITALS
BODY MASS INDEX: 32.79 KG/M2 | DIASTOLIC BLOOD PRESSURE: 64 MMHG | OXYGEN SATURATION: 98 % | WEIGHT: 152 LBS | HEART RATE: 81 BPM | SYSTOLIC BLOOD PRESSURE: 114 MMHG | TEMPERATURE: 96.6 F | HEIGHT: 57 IN

## 2024-03-04 DIAGNOSIS — N30.01 ACUTE CYSTITIS WITH HEMATURIA: ICD-10-CM

## 2024-03-04 LAB
AMBIGUOUS DTTM AMBI4: NORMAL
APPEARANCE UR: CLEAR
BILIRUB UR STRIP-MCNC: NEGATIVE MG/DL
COLOR UR AUTO: YELLOW
GLUCOSE UR STRIP.AUTO-MCNC: NEGATIVE MG/DL
KETONES UR STRIP.AUTO-MCNC: NEGATIVE MG/DL
LEUKOCYTE ESTERASE UR QL STRIP.AUTO: NORMAL
NITRITE UR QL STRIP.AUTO: NEGATIVE
PH UR STRIP.AUTO: 5.5 [PH] (ref 5–8)
PROT UR QL STRIP: NEGATIVE MG/DL
RBC UR QL AUTO: NORMAL
SP GR UR STRIP.AUTO: 1.02
UROBILINOGEN UR STRIP-MCNC: 0.2 MG/DL

## 2024-03-04 PROCEDURE — 87086 URINE CULTURE/COLONY COUNT: CPT

## 2024-03-04 PROCEDURE — 3078F DIAST BP <80 MM HG: CPT | Performed by: NURSE PRACTITIONER

## 2024-03-04 PROCEDURE — 99213 OFFICE O/P EST LOW 20 MIN: CPT | Performed by: NURSE PRACTITIONER

## 2024-03-04 PROCEDURE — 81002 URINALYSIS NONAUTO W/O SCOPE: CPT | Performed by: NURSE PRACTITIONER

## 2024-03-04 PROCEDURE — 3074F SYST BP LT 130 MM HG: CPT | Performed by: NURSE PRACTITIONER

## 2024-03-04 RX ORDER — SULFAMETHOXAZOLE AND TRIMETHOPRIM 800; 160 MG/1; MG/1
1 TABLET ORAL 2 TIMES DAILY
Qty: 6 TABLET | Refills: 0 | Status: SHIPPED | OUTPATIENT
Start: 2024-03-04 | End: 2024-03-07

## 2024-03-04 ASSESSMENT — ENCOUNTER SYMPTOMS
CARDIOVASCULAR NEGATIVE: 1
ALLERGIC/IMMUNOLOGIC NEGATIVE: 1
RESPIRATORY NEGATIVE: 1
MUSCULOSKELETAL NEGATIVE: 1
PSYCHIATRIC NEGATIVE: 1
HEMATOLOGIC/LYMPHATIC NEGATIVE: 1
FLANK PAIN: 0
GASTROINTESTINAL NEGATIVE: 1
ENDOCRINE NEGATIVE: 1
CONSTITUTIONAL NEGATIVE: 1
NEUROLOGICAL NEGATIVE: 1
EYES NEGATIVE: 1

## 2024-03-04 ASSESSMENT — PATIENT HEALTH QUESTIONNAIRE - PHQ9: CLINICAL INTERPRETATION OF PHQ2 SCORE: 0

## 2024-03-04 ASSESSMENT — FIBROSIS 4 INDEX: FIB4 SCORE: 1.67

## 2024-03-04 NOTE — PROGRESS NOTES
"Verbal consent was acquired by the patient to use [email or web address] ambient listening note generation during this visit Yes      Lan Farley is a 68 y.o. female who presents for:  History of Present Illness  The patient presents for evaluation of burning with urination.    She went to urgent care 6 months ago for burning with urination. She was given an antibiotic twice a day for 3 days and her symptoms improved. Now, her symptoms have returned. She has burning with urination. She only urinates in the morning when she has coffee. She does not urinate a lot in the afternoon. This is her second time having these symptoms. Her sister also has a urine infection. Her symptoms have been present for 2 weeks. Her symptoms come and go. She denies any fevers or chills. She is drinking enough water. She had pain in her left hip for 2 days, but that has resolved. She denies any blood in her urine. She denies any vaginal discharge or itching.   Her sister has a urine infection.   She is not allergic to any antibiotics.    Review of Systems   Constitutional: Negative.    HENT: Negative.     Eyes: Negative.    Respiratory: Negative.     Cardiovascular: Negative.    Gastrointestinal: Negative.    Endocrine: Negative.    Genitourinary:  Positive for dysuria. Negative for difficulty urinating, flank pain, frequency, hematuria and urgency.   Musculoskeletal: Negative.         Left hip pain   Skin: Negative.    Allergic/Immunologic: Negative.    Neurological: Negative.    Hematological: Negative.    Psychiatric/Behavioral: Negative.       Objective   /64 (BP Location: Right arm, Patient Position: Sitting, BP Cuff Size: Adult)   Pulse 81   Temp 35.9 °C (96.6 °F) (Temporal)   Ht 1.448 m (4' 9\")   Wt 68.9 kg (152 lb)   SpO2 98%   Physical Exam  General: Well nourished, well developed female in NAD, awake and conversant.  Eyes: Normal conjunctiva, anicteric.  Round symmetrical pupils.  ENT: Hearing grossly intact.  No nasal " discharge.  Neck: Neck is supple.  No masses or thyromegaly.  CV: No lower extremity edema.  Respiratory: Respirations are nonlabored.  No wheezing.  Abdomen: Non-Distended. - CVA tenderness  Skin: Warm.  No rashes or ulcers.  MSK: Normal ambulation.  No clubbing or cyanosis.  Neuro: Sensation and CN II-XII grossly normal.  Psych: Alert and oriented.  Cooperative, appropriate mood and affect, normal judgment.      Results  Testing  Urinalysis was reviewed with the patient.    Assessment & Plan  1. Acute cystitis with hematuria  Urinalysis shows that there is some blood in her urine and trace leukocyte esterase. I will treat her with Bactrim 800-160 mg 1 pill twice a day for 3 days. I will send her urine for culture. She was advised to stay hydrated and complete entire course of antibiotics even if symptoms resolve. I will notify her of urine culture results through [facility].  - POCT Urinalysis  - URINE CULTURE(NEW); Future  - sulfamethoxazole-trimethoprim (BACTRIM DS) 800-160 MG tablet; Take 1 Tablet by mouth 2 times a day for 3 days.  Dispense: 6 Tablet; Refill: 0     I have placed the below orders and discussed them with an approved delegating provider. The MA is performing the below orders under the direction of Dr. James.      Return if symptoms worsen or fail to improve.     Please note that this dictation was created using voice recognition software. I have made every reasonable attempt to correct obvious errors, but I expect that there are errors of grammar and possibly content that I did not discover before finalizing the note.

## 2024-03-06 LAB
BACTERIA UR CULT: NORMAL
SIGNIFICANT IND 70042: NORMAL
SITE SITE: NORMAL
SOURCE SOURCE: NORMAL

## 2024-04-24 ENCOUNTER — HOSPITAL ENCOUNTER (OUTPATIENT)
Dept: RADIOLOGY | Facility: MEDICAL CENTER | Age: 69
End: 2024-04-24
Attending: NURSE PRACTITIONER
Payer: MEDICARE

## 2024-04-24 DIAGNOSIS — Z12.31 ENCOUNTER FOR SCREENING MAMMOGRAM FOR MALIGNANT NEOPLASM OF BREAST: ICD-10-CM

## 2024-04-24 PROCEDURE — 77067 SCR MAMMO BI INCL CAD: CPT

## 2024-04-24 NOTE — TELEPHONE ENCOUNTER
----- Message from ELMIRA Haywood sent at 11/5/2019  7:09 AM PST -----  Kristie  Thanks for getting your lab work done prior to our appointment on the 12th.  We will review the lab work at that time.  See you soon  ELMIRA Haywood     Detail Level: Zone Hide Additional Notes?: No

## 2024-05-13 ENCOUNTER — DOCUMENTATION (OUTPATIENT)
Dept: HEALTH INFORMATION MANAGEMENT | Facility: OTHER | Age: 69
End: 2024-05-13
Payer: MEDICARE

## 2024-06-13 SDOH — HEALTH STABILITY: PHYSICAL HEALTH: ON AVERAGE, HOW MANY DAYS PER WEEK DO YOU ENGAGE IN MODERATE TO STRENUOUS EXERCISE (LIKE A BRISK WALK)?: 4 DAYS

## 2024-06-13 SDOH — ECONOMIC STABILITY: HOUSING INSECURITY
IN THE LAST 12 MONTHS, WAS THERE A TIME WHEN YOU DID NOT HAVE A STEADY PLACE TO SLEEP OR SLEPT IN A SHELTER (INCLUDING NOW)?: NO

## 2024-06-13 SDOH — ECONOMIC STABILITY: INCOME INSECURITY: IN THE LAST 12 MONTHS, WAS THERE A TIME WHEN YOU WERE NOT ABLE TO PAY THE MORTGAGE OR RENT ON TIME?: NO

## 2024-06-13 SDOH — ECONOMIC STABILITY: INCOME INSECURITY: HOW HARD IS IT FOR YOU TO PAY FOR THE VERY BASICS LIKE FOOD, HOUSING, MEDICAL CARE, AND HEATING?: SOMEWHAT HARD

## 2024-06-13 SDOH — HEALTH STABILITY: PHYSICAL HEALTH: ON AVERAGE, HOW MANY MINUTES DO YOU ENGAGE IN EXERCISE AT THIS LEVEL?: 30 MIN

## 2024-06-13 SDOH — HEALTH STABILITY: MENTAL HEALTH
STRESS IS WHEN SOMEONE FEELS TENSE, NERVOUS, ANXIOUS, OR CAN'T SLEEP AT NIGHT BECAUSE THEIR MIND IS TROUBLED. HOW STRESSED ARE YOU?: NOT AT ALL

## 2024-06-13 SDOH — ECONOMIC STABILITY: TRANSPORTATION INSECURITY
IN THE PAST 12 MONTHS, HAS LACK OF TRANSPORTATION KEPT YOU FROM MEETINGS, WORK, OR FROM GETTING THINGS NEEDED FOR DAILY LIVING?: NO

## 2024-06-13 SDOH — ECONOMIC STABILITY: HOUSING INSECURITY: IN THE LAST 12 MONTHS, HOW MANY PLACES HAVE YOU LIVED?: 1

## 2024-06-13 SDOH — ECONOMIC STABILITY: TRANSPORTATION INSECURITY
IN THE PAST 12 MONTHS, HAS THE LACK OF TRANSPORTATION KEPT YOU FROM MEDICAL APPOINTMENTS OR FROM GETTING MEDICATIONS?: NO

## 2024-06-13 SDOH — ECONOMIC STABILITY: TRANSPORTATION INSECURITY
IN THE PAST 12 MONTHS, HAS LACK OF RELIABLE TRANSPORTATION KEPT YOU FROM MEDICAL APPOINTMENTS, MEETINGS, WORK OR FROM GETTING THINGS NEEDED FOR DAILY LIVING?: NO

## 2024-06-13 SDOH — ECONOMIC STABILITY: FOOD INSECURITY: WITHIN THE PAST 12 MONTHS, YOU WORRIED THAT YOUR FOOD WOULD RUN OUT BEFORE YOU GOT MONEY TO BUY MORE.: NEVER TRUE

## 2024-06-13 ASSESSMENT — LIFESTYLE VARIABLES
AUDIT-C TOTAL SCORE: 0
HOW OFTEN DO YOU HAVE A DRINK CONTAINING ALCOHOL: NEVER
SKIP TO QUESTIONS 9-10: 1
HOW MANY STANDARD DRINKS CONTAINING ALCOHOL DO YOU HAVE ON A TYPICAL DAY: PATIENT DOES NOT DRINK
HOW OFTEN DO YOU HAVE SIX OR MORE DRINKS ON ONE OCCASION: NEVER

## 2024-06-13 ASSESSMENT — SOCIAL DETERMINANTS OF HEALTH (SDOH)
IN A TYPICAL WEEK, HOW MANY TIMES DO YOU TALK ON THE PHONE WITH FAMILY, FRIENDS, OR NEIGHBORS?: MORE THAN THREE TIMES A WEEK
IN THE PAST 12 MONTHS, HAS THE ELECTRIC, GAS, OIL, OR WATER COMPANY THREATENED TO SHUT OFF SERVICE IN YOUR HOME?: NO
HOW OFTEN DO YOU ATTEND CHURCH OR RELIGIOUS SERVICES?: PATIENT DECLINED
HOW OFTEN DO YOU HAVE A DRINK CONTAINING ALCOHOL: NEVER
HOW HARD IS IT FOR YOU TO PAY FOR THE VERY BASICS LIKE FOOD, HOUSING, MEDICAL CARE, AND HEATING?: SOMEWHAT HARD
HOW OFTEN DO YOU GET TOGETHER WITH FRIENDS OR RELATIVES?: ONCE A WEEK
WITHIN THE PAST 12 MONTHS, YOU WORRIED THAT YOUR FOOD WOULD RUN OUT BEFORE YOU GOT THE MONEY TO BUY MORE: NEVER TRUE
HOW MANY DRINKS CONTAINING ALCOHOL DO YOU HAVE ON A TYPICAL DAY WHEN YOU ARE DRINKING: PATIENT DOES NOT DRINK
HOW OFTEN DO YOU ATTENT MEETINGS OF THE CLUB OR ORGANIZATION YOU BELONG TO?: PATIENT DECLINED
HOW OFTEN DO YOU ATTENT MEETINGS OF THE CLUB OR ORGANIZATION YOU BELONG TO?: PATIENT DECLINED
DO YOU BELONG TO ANY CLUBS OR ORGANIZATIONS SUCH AS CHURCH GROUPS UNIONS, FRATERNAL OR ATHLETIC GROUPS, OR SCHOOL GROUPS?: NO
HOW OFTEN DO YOU ATTEND CHURCH OR RELIGIOUS SERVICES?: PATIENT DECLINED
IN A TYPICAL WEEK, HOW MANY TIMES DO YOU TALK ON THE PHONE WITH FAMILY, FRIENDS, OR NEIGHBORS?: MORE THAN THREE TIMES A WEEK
HOW OFTEN DO YOU GET TOGETHER WITH FRIENDS OR RELATIVES?: ONCE A WEEK
HOW OFTEN DO YOU HAVE SIX OR MORE DRINKS ON ONE OCCASION: NEVER
DO YOU BELONG TO ANY CLUBS OR ORGANIZATIONS SUCH AS CHURCH GROUPS UNIONS, FRATERNAL OR ATHLETIC GROUPS, OR SCHOOL GROUPS?: NO

## 2024-06-14 ENCOUNTER — OFFICE VISIT (OUTPATIENT)
Dept: MEDICAL GROUP | Facility: PHYSICIAN GROUP | Age: 69
End: 2024-06-14
Payer: MEDICARE

## 2024-06-14 VITALS
WEIGHT: 152 LBS | TEMPERATURE: 96.6 F | OXYGEN SATURATION: 97 % | HEIGHT: 58 IN | HEART RATE: 78 BPM | SYSTOLIC BLOOD PRESSURE: 116 MMHG | BODY MASS INDEX: 31.91 KG/M2 | DIASTOLIC BLOOD PRESSURE: 70 MMHG

## 2024-06-14 DIAGNOSIS — E66.9 OBESITY (BMI 30-39.9): ICD-10-CM

## 2024-06-14 DIAGNOSIS — M85.80 OSTEOPENIA, UNSPECIFIED LOCATION: ICD-10-CM

## 2024-06-14 DIAGNOSIS — I10 ESSENTIAL HYPERTENSION: Chronic | ICD-10-CM

## 2024-06-14 DIAGNOSIS — E78.1 HYPERTRIGLYCERIDEMIA: Chronic | ICD-10-CM

## 2024-06-14 DIAGNOSIS — Z00.00 ENCOUNTER FOR ANNUAL WELLNESS VISIT (AWV) IN MEDICARE PATIENT: ICD-10-CM

## 2024-06-14 DIAGNOSIS — L40.50 PSORIATIC ARTHRITIS (HCC): Chronic | ICD-10-CM

## 2024-06-14 DIAGNOSIS — E11.9 TYPE 2 DIABETES MELLITUS WITHOUT COMPLICATION, WITHOUT LONG-TERM CURRENT USE OF INSULIN (HCC): Chronic | ICD-10-CM

## 2024-06-14 LAB
HBA1C MFR BLD: 6.8 % (ref ?–5.8)
POCT INT CON NEG: NEGATIVE
POCT INT CON POS: POSITIVE

## 2024-06-14 PROCEDURE — G0439 PPPS, SUBSEQ VISIT: HCPCS | Performed by: NURSE PRACTITIONER

## 2024-06-14 PROCEDURE — 3078F DIAST BP <80 MM HG: CPT | Performed by: NURSE PRACTITIONER

## 2024-06-14 PROCEDURE — 3074F SYST BP LT 130 MM HG: CPT | Performed by: NURSE PRACTITIONER

## 2024-06-14 PROCEDURE — 83036 HEMOGLOBIN GLYCOSYLATED A1C: CPT | Performed by: NURSE PRACTITIONER

## 2024-06-14 ASSESSMENT — ACTIVITIES OF DAILY LIVING (ADL): BATHING_REQUIRES_ASSISTANCE: 0

## 2024-06-14 ASSESSMENT — FIBROSIS 4 INDEX: FIB4 SCORE: 1.67

## 2024-06-14 ASSESSMENT — MONTREAL COGNITIVE ASSESSMENT (MOCA)
9. REPEAT EACH SENTENCE: 2/2
ORIENTATION SUBSCORE: 6/6
WHAT IS THE TOTAL SCORE (OUT OF 30): 21
1. ALTERNATING TRAIL MAKING: 1/1
6. READ LIST OF DIGITS [FORWARD/BACKWARD]: 2/2
11. FOR EACH PAIR OF WORDS, WHAT CATEGORY DO THEY BELONG TO (OUT OF 2): 2/2
8. SERIAL SUBTRACTION OF 7S: 0/5
7. [VIGILENCE] TAP WHEN HEARING DESIGNATED LETTER: 0/1
3. DRAW A CLOCK: CONTOUR, NUMBERS, HANDS: 0/3
2. COPY DRAWING: 0/1
10. [FLUENCY] NAME WORDS STARTING WITH DESIGNATED LETTER: 0/1
4. NAME EACH OF THE THREE ANIMALS SHOWN: 3/3
DELAYED RECALL SUBSCORE: 5/5
5. MEMORY TRIALS: FIRST TRIAL;SECOND TRIAL

## 2024-06-14 ASSESSMENT — ENCOUNTER SYMPTOMS: GENERAL WELL-BEING: EXCELLENT

## 2024-06-14 ASSESSMENT — PATIENT HEALTH QUESTIONNAIRE - PHQ9: CLINICAL INTERPRETATION OF PHQ2 SCORE: 0

## 2024-06-14 NOTE — ASSESSMENT & PLAN NOTE
Chronic; Goal BP < 130/80  BP today in clinic 116/70; no CP, palpitations, dizziness, edema reported today  GFR 91; creat 0.72  - continue Lisinopril 10mg QD  - monitor annual CMP/fasting lipid

## 2024-06-14 NOTE — PROGRESS NOTES
Chief Complaint   Patient presents with    Annual Wellness Visit       HPI:  Kristie Farley is a 68 y.o. here for Medicare Annual Wellness Visit. Doing well, no new concerns. Was not able draw clock or recall 3 words. We repeated cognition assessment with MOCA test today. She did not attend school as a child. She is unable to tell time with a round clock. She does not know how to do simple subtraction/addition. Otherwise she has good recall of recent events, knows today's date, medications and disease management.  There is also some language barrier, English is not her first language.     Patient Active Problem List    Diagnosis Date Noted    Microcytosis 09/24/2022    Toe pain, bilateral 04/19/2021    Obesity (BMI 30-39.9) 11/03/2017    Well woman exam with routine gynecological exam 04/07/2017    Essential hypertension 08/08/2016    Type 2 diabetes mellitus without complication, without long-term current use of insulin (HCC) 10/20/2015    Hypertriglyceridemia 05/09/2013    Colon polyp 04/24/2013    Osteopenia 04/24/2013    Psoriatic arthritis ([facility])     Psoriasis        Current Outpatient Medications   Medication Sig Dispense Refill    rosuvastatin (CRESTOR) 20 MG Tab TAKE ONE TABLET BY MOUTH IN THE EVENING 90 Tablet 3    JANUVIA 50 MG Tab TAKE ONE TABLET BY MOUTH ONE TIME DAILY 90 Tablet 3    TRINA ASPIRIN PO Trina Aspirin      Calcium Carb-Cholecalciferol (CALCIUM 1000 + D PO) Calcium      lisinopril (PRINIVIL) 10 MG Tab TAKE ONE TABLET BY MOUTH ONE TIME DAILY 90 Tablet 3    metformin (GLUCOPHAGE) 1000 MG tablet TAKE 1 TABLET BY MOUTH 2 TIMES A DAY WITH MEALS 180 Tablet 3    ACCU-CHEK SMARTVIEW strip USE TO TEST BLOOD GLUCOSE LEVELS ONCE DAILY 50 Strip 6    ENBREL SURECLICK 50 MG/ML Solution Auto-injector       Blood Glucose Monitoring Suppl (ACCU-CHEK ADVANTAGE DIABETES) KIT Test blood sugar every morning. 1 Kit 0    Lancet Device MISC Test blood sugar every morning. 100 Each 4     No current facility-administered  medications for this visit.          Current supplements as per medication list.     Allergies: Patient has no known allergies.    Current social contact/activities: family comes to visit 2-3 times a month and babysits a lot, goes to [employer]      She  reports that she has been smoking cigarettes. She has never used smokeless tobacco. She reports current alcohol use. She reports that she does not use drugs.  Ready to quit: Not Answered  Counseling given: Not Answered  Tobacco comments: 3 cigs a day      ROS:    Gait: Uses no assistive device  Ostomy: No  Other tubes: No  Amputations: No  Chronic oxygen use: No  Last eye exam: 1 year ago but has appt coming up 8/16/24   Wears hearing aids: No   : Denies any urinary leakage during the last 6 months    Screening:  Depression Screening  Little interest or pleasure in doing things?  0 - not at all  Feeling down, depressed , or hopeless? 0 - not at all  Patient Health Questionnaire Score: 0     If depressive symptoms identified deferred to follow up visit unless specifically addressed in assessment and plan.    Interpretation of PHQ-9 Total Score   Score Severity   1-4 No Depression   5-9 Mild Depression   10-14 Moderate Depression   15-19 Moderately Severe Depression   20-27 Severe Depression    Screening for Cognitive Impairment  Do you or any of your friends or family members have any concern about your memory? No  Three Minute Recall (Leader, Season, Table) 0/3    Mike clock face with all 12 numbers and set the hands to show 10 minutes after 11.  No Pt did a [place] and put 4 hands on the clock and wrote out 11:10 on the clock   Cognitive concerns identified deferred for follow up unless specifically addressed in assessment and plan.    Fall Risk Assessment  Has the patient had two or more falls in the last year or any fall with injury in the last year?  No    Safety Assessment  Do you always wear your seatbelt?  Yes  Any changes to home needed to function safely?  No  Difficulty hearing.  No  Patient counseled about all safety risks that were identified.    Functional Assessment ADLs  Are there any barriers preventing you from cooking for yourself or meeting nutritional needs?  No.    Are there any barriers preventing you from driving safely or obtaining transportation?  No.    Are there any barriers preventing you from using a telephone or calling for help?  No    Are there any barriers preventing you from shopping?  No.    Are there any barriers preventing you from taking care of your own finances?  No    Are there any barriers preventing you from managing your medications?  No    Are there any barriers preventing you from showering, bathing or dressing yourself? No    Are there any barriers preventing you from doing housework or laundry? No  Are there any barriers preventing you from using the toilet?No  Are you currently engaging in any exercise or physical activity?  Yes. Goes on walks and babysits      Self-Assessment of Health  What is your perception of your health? Excellent  Do you sleep more than six hours a night? No  In the past 7 days, how much did pain keep you from doing your normal work? Some  Do you spend quality time with family or friends (virtually or in person)? Yes  Do you usually eat a heart healthy diet that constists of a variety of fruits, vegetables, whole grains and fiber? Yes  Do you eat foods high in fat and/or Fast Food more than three times per week? No    Advance Care Planning  Do you have an Advance Directive, Living Will, Durable Power of , or POLST? No                 Health Maintenance Summary            Overdue - Diabetes: Retinopathy Screening (Yearly) Overdue since 2/10/2023      02/10/2022  REFERRAL FOR RETINAL SCREENING EXAM    02/10/2022  REFERRAL FOR RETINAL SCREENING EXAM    03/21/2019  REFERRAL FOR RETINAL SCREENING EXAM    03/16/2018  REFERRAL FOR RETINAL SCREENING EXAM    03/04/2016  AMB REFERRAL FOR RETINAL SCREENING  EXAM    Only the first 5 history entries have been loaded, but more history exists.              Ordered - Colorectal Cancer Screening (Colonoscopy - Every 10 Years) Ordered on 12/12/2023 08/30/2013  Colonoscopy (Done)              IMM DTaP/Tdap/Td Vaccine (2 - Td or Tdap) Due soon on 6/25/2024 06/25/2014  Imm Admin: Tdap Vaccine              Postponed - COVID-19 Vaccine (5 - 2023-24 season) Postponed until 9/6/2024 09/25/2022  Imm Admin: PFIZER BIVALENT SARS-COV-2 VACCINE (12+)    10/27/2021  Imm Admin: PFIZER PURPLE CAP SARS-COV-2 VACCINATION (12+)    04/16/2021  Imm Admin: PFIZER PURPLE CAP SARS-COV-2 VACCINATION (12+)    03/24/2021  Imm Admin: PFIZER PURPLE CAP SARS-COV-2 VACCINATION (12+)              Postponed - Bone Density Scan (Every 5 Years) Postponed until 12/12/2024      No completion history exists for this topic.              Diabetes: Monofilament / LE Exam (Yearly) Next due on 9/6/2024 09/06/2023  Diabetic Monofilament LE Exam    09/06/2023  SmartData: WORKFLOW - DIABETES - DIABETIC FOOT EXAM PERFORMED    09/24/2022  Diabetic Monofilament LE Exam    09/21/2022  SmartData: WORKFLOW - DIABETES - DIABETIC FOOT EXAM PERFORMED    11/17/2020  SmartData: WORKFLOW - DIABETES - DIABETIC FOOT EXAM PERFORMED    Only the first 5 history entries have been loaded, but more history exists.              Fasting Lipid Profile (Yearly) Next due on 12/2/2024 12/02/2023  Lipid Profile    07/27/2022  Lipid Profile    05/10/2021  Lipid Profile    04/27/2020  Lipid Profile    11/04/2019  Lipid Profile    Only the first 5 history entries have been loaded, but more history exists.              Diabetes: Urine Protein Screening (Yearly) Next due on 12/2/2024 12/02/2023  MICROALBUMIN CREAT RATIO URINE    07/27/2022  MICROALBUMIN CREAT RATIO URINE    05/10/2021  MICROALBUMIN CREAT RATIO URINE    11/04/2019  MICROALBUMIN CREAT RATIO URINE    10/19/2018  MICROALBUMIN CREAT RATIO URINE    Only the  first 5 history entries have been loaded, but more history exists.              SERUM CREATININE (Yearly) Next due on 12/2/2024 12/02/2023  Comp Metabolic Panel    12/30/2022  Comp Metabolic Panel    07/27/2022  Comp Metabolic Panel    05/10/2021  Comp Metabolic Panel    07/16/2020  Comp Metabolic Panel    Only the first 5 history entries have been loaded, but more history exists.              A1c Screening (Every 6 Months) Next due on 12/14/2024 06/14/2024  POCT  A1C    12/02/2023  HEMOGLOBIN A1C    09/06/2023  POCT Hemoglobin A1C    05/24/2023  POCT  A1C    12/30/2022  HEMOGLOBIN A1C    Only the first 5 history entries have been loaded, but more history exists.              Annual Wellness Visit (Yearly) Next due on 6/14/2025 06/14/2024  Level of Service: ANNUAL WELLNESS VISIT-INCLUDES PPPS SUBSEQUE*    01/20/2023  Level of Service: [place] ANNUAL WELLNESS VISIT-INCLUDES PPPS SUBSEQUE*              Mammogram (Every 2 Years) Next due on 4/24/2026 04/24/2024  MA-SCREENING MAMMO BILAT W/TOMOSYNTHESIS W/CAD    10/18/2021  MA-SCREENING MAMMO BILAT W/TOMOSYNTHESIS W/CAD    05/16/2019  MA-SCREEN MAMMO W/CAD-BILAT    12/08/2017  MA-SCREEN MAMMO W/CAD-BILAT    09/21/2015  MA-SCREEN MAMMO W/CAD-BILAT    Only the first 5 history entries have been loaded, but more history exists.              Hepatitis C Screening  Completed      04/27/2020  Hepatitis C Antibody component of HEP C VIRUS ANTIBODY    12/02/2019  Hepatitis C Antibody component of HEPATITIS PANEL ACUTE(4 COMPONENTS)              Zoster (Shingles) Vaccines (Series Information) Completed      09/21/2020  Imm Admin: Zoster Vaccine Recombinant (RZV) (SHINGRIX)    06/17/2020  Imm Admin: Zoster Vaccine Recombinant (RZV) (SHINGRIX)    10/20/2015  Imm Admin: Zoster Vaccine Live (ZVL) (Zostavax) - HISTORICAL DATA              Pneumococcal Vaccine: 65+ Years (Series Information) Completed      11/17/2020  Imm Admin: Pneumococcal polysaccharide vaccine  (PPSV-23)    10/19/2018  Imm Admin: Pneumococcal Conjugate Vaccine (Prevnar/PCV-13)    01/16/2014  Imm Admin: Pneumococcal polysaccharide vaccine (PPSV-23)              Influenza Vaccine (Series Information) Completed      09/16/2023  Imm Admin: Influenza Vaccine Adult HD    09/25/2022  Imm Admin: Influenza Vaccine Adult HD    11/15/2021  Imm Admin: Influenza Vaccine Adult HD    11/17/2020  Imm Admin: Influenza Vaccine Adult HD    11/12/2019  Imm Admin: Influenza Vaccine Quad Inj (Pf)    Only the first 5 history entries have been loaded, but more history exists.              Hepatitis A Vaccine (Hep A) (Series Information) Aged Out      No completion history exists for this topic.              Hepatitis B Vaccine (Hep B) (Series Information) Aged Out      No completion history exists for this topic.              HPV Vaccines (Series Information) Aged Out      No completion history exists for this topic.              Polio Vaccine (Inactivated Polio) (Series Information) Aged Out      No completion history exists for this topic.              Meningococcal Immunization (Series Information) Aged Out      No completion history exists for this topic.              Discontinued - Cervical Cancer Screening  Discontinued        Frequency changed to Never automatically (Topic No Longer Applies)    04/07/2017  PATHOLOGY GYN SPECIMEN    04/07/2017  THINPREP PAP,REFLEX HPV ON ASC-US ONLY    04/24/2013  PAP IG, RFX HPV ALL PTH                    Patient Care Team:  Mary He D.N.P. as PCP - General (Nurse Practitioner Family)        Social History     Tobacco Use    Smoking status: Some Days     Current packs/day: 0.25     Types: Cigarettes    Smokeless tobacco: Never    Tobacco comments:     3 cigs a day   Vaping Use    Vaping status: Never Used   Substance Use Topics    Alcohol use: Yes     Comment: special occasions    Drug use: No     History reviewed. No pertinent family history.  She  has a past medical history of  "Diabetes (HCC), Hyperplastic colon polyp (8/30/13), Hypertension, Hypertriglyceridemia (5/9/2013), Osteopenia, Psoriasis, and Psoriatic arthritis (HCC).    She has no past medical history of Cancer (HCC).   Past Surgical History:   Procedure Laterality Date    COLONOSCOPY  08/30/13    CATARACT EXTRACTION WITH IOL      TONSILLECTOMY         Exam:   /70 (BP Location: Left arm, Patient Position: Sitting, BP Cuff Size: Adult)   Pulse 78   Temp 35.9 °C (96.6 °F) (Temporal)   Ht 1.461 m (4' 9.5\")   Wt 68.9 kg (152 lb)   SpO2 97%  Body mass index is 32.32 kg/m².    Hearing good.    Dentition good  Alert, oriented in no acute distress.  Eye contact is good, speech goal directed, affect calm    Physical Exam  Constitutional:       Appearance: Normal appearance.   Cardiovascular:      Rate and Rhythm: Normal rate and regular rhythm.      Pulses: Normal pulses.      Heart sounds: Normal heart sounds.   Pulmonary:      Effort: Pulmonary effort is normal.      Breath sounds: Normal breath sounds.   Musculoskeletal:      Cervical back: Normal range of motion and neck supple.      Right lower leg: No edema.      Left lower leg: No edema.   Skin:     General: Skin is warm and dry.   Neurological:      General: No focal deficit present.      Mental Status: She is alert and oriented to person, place, and time.   Psychiatric:         Mood and Affect: Mood normal.         Behavior: Behavior normal.         Thought Content: Thought content normal.         Judgment: Judgment normal.          Assessment and Plan. The following treatment and monitoring plan is recommended:    Problem List Items Addressed This Visit       Psoriatic arthritis (HCC) (Chronic)     Chronic; Managed by Rheumatology/Dr Rajinder Capps on Enbrel IJ  Uses tylenol for occasional joint pain  Sees [facility]         Hypertriglyceridemia (Chronic)      Latest Reference Range & Units 07/27/22 06:21 12/02/23 07:46   Cholesterol,Tot 100 - 199 " mg/dL 100 110   Triglycerides 0 - 149 mg/dL 165 (H) 165 (H)   HDL >=40 mg/dL 47 51   LDL <100 mg/dL 20 26     Chronic; LDL goal < 100; tolerating statin; no myalgia; hx hypertension, DM2 on oral meds  - continue Rosuvastatin 20mg QHS  - heart healthy eating, increase fiber intake; stays active with walking  - monitor fasting lipid annually         Type 2 diabetes mellitus without complication, without long-term current use of insulin (HCC) (Chronic)     Chronic; A1C goal <6.5  A1C at 6.8 today in clinic (up from 6.1). She would like to continue working diet first before we adjust medication. FBG at home   - continue  Metformin 1000mg BID, Januvia 50mg QD  - counseled on eliminating added sugars in dietary intake; cut down rice, add more steamed fresh vegetables for fullness  - recheck A1c in 3-6 months         Relevant Orders    POCT  A1C (Completed)    Essential hypertension (Chronic)     Chronic; Goal BP < 130/80  BP today in clinic 116/70; no CP, palpitations, dizziness, edema reported today  GFR 91; creat 0.72  - continue Lisinopril 10mg QD  - monitor annual CMP/fasting lipid         Osteopenia     Taking daily vitamin D  Declined bone density  No recent falls/fractures         Obesity (BMI 30-39.9)    Relevant Orders    Patient identified as having weight management issue.  Appropriate orders and counseling given.     Other Visit Diagnoses       Encounter for annual wellness visit (AWV) in Medicare patient              Services suggested: No services needed at this time  Health Care Screening: Age-appropriate preventive services recommended by USPTF and ACIP covered by Medicare were discussed today. Services ordered if indicated and agreed upon by the patient.  Referrals offered: Community-based lifestyle interventions to reduce health risks and promote self-management and wellness, fall prevention, nutrition, physical activity, tobacco-use cessation, weight loss, and mental health services as per orders  if indicated.    Discussion today about general wellness and lifestyle habits:    Prevent falls and reduce trip hazards; Cautioned about securing or removing rugs.  Have a working fire alarm and carbon monoxide detector;   Engage in regular physical activity and social activities     Follow-up: Return in about 6 months (around 12/14/2024) for establish care.

## 2024-06-14 NOTE — ASSESSMENT & PLAN NOTE
Chronic; A1C goal <6.5  A1C at 6.8 today in clinic (up from 6.1). She would like to continue working diet first before we adjust medication. FBG at home   - continue  Metformin 1000mg BID, Januvia 50mg QD  - counseled on eliminating added sugars in dietary intake; cut down rice, add more steamed fresh vegetables for fullness  - recheck A1c in 3-6 months

## 2024-08-14 ENCOUNTER — PATIENT MESSAGE (OUTPATIENT)
Dept: MEDICAL GROUP | Facility: PHYSICIAN GROUP | Age: 69
End: 2024-08-14
Payer: MEDICARE

## 2024-08-14 DIAGNOSIS — E11.9 TYPE 2 DIABETES MELLITUS WITHOUT COMPLICATION, WITHOUT LONG-TERM CURRENT USE OF INSULIN (HCC): Chronic | ICD-10-CM

## 2024-08-14 DIAGNOSIS — E11.65 UNCONTROLLED TYPE 2 DIABETES MELLITUS WITH HYPERGLYCEMIA (HCC): ICD-10-CM

## 2024-08-14 DIAGNOSIS — I10 ESSENTIAL HYPERTENSION: ICD-10-CM

## 2024-08-14 RX ORDER — LISINOPRIL 10 MG/1
TABLET ORAL
Qty: 90 TABLET | Refills: 0 | Status: SHIPPED | OUTPATIENT
Start: 2024-08-14 | End: 2024-08-20 | Stop reason: SDUPTHER

## 2024-08-14 NOTE — PATIENT COMMUNICATION
Received request via: Patient    Was the patient seen in the last year in this department? Yes    Does the patient have an active prescription (recently filled or refills available) for medication(s) requested? No    Pharmacy Name: [facility]    Does the patient have correction Plus and need 100-day supply? (This applies to ALL medications) Patient does not have SCP

## 2024-08-14 NOTE — PROGRESS NOTES
Requested Prescriptions     Signed Prescriptions Disp Refills    lisinopril (PRINIVIL) 10 MG Tab 90 Tablet 0     Sig: TAKE ONE TABLET BY MOUTH ONE TIME DAILY     Authorizing Provider: ALYSSA PERSON    metformin (GLUCOPHAGE) 1000 MG tablet 180 Tablet 0     Sig: Take 1 Tablet by mouth 2 times a day with meals.     Authorizing Provider: ALYSSA PERSON A.P.R.N.

## 2024-08-20 ENCOUNTER — OFFICE VISIT (OUTPATIENT)
Dept: MEDICAL GROUP | Facility: PHYSICIAN GROUP | Age: 69
End: 2024-08-20
Payer: MEDICARE

## 2024-08-20 ENCOUNTER — HOSPITAL ENCOUNTER (OUTPATIENT)
Facility: MEDICAL CENTER | Age: 69
End: 2024-08-20
Attending: NURSE PRACTITIONER
Payer: MEDICARE

## 2024-08-20 VITALS
WEIGHT: 154 LBS | HEART RATE: 80 BPM | DIASTOLIC BLOOD PRESSURE: 64 MMHG | TEMPERATURE: 97.5 F | HEIGHT: 58 IN | OXYGEN SATURATION: 94 % | BODY MASS INDEX: 32.32 KG/M2 | SYSTOLIC BLOOD PRESSURE: 130 MMHG

## 2024-08-20 DIAGNOSIS — E11.9 TYPE 2 DIABETES MELLITUS WITHOUT COMPLICATION, WITHOUT LONG-TERM CURRENT USE OF INSULIN (HCC): ICD-10-CM

## 2024-08-20 DIAGNOSIS — R30.0 DYSURIA: ICD-10-CM

## 2024-08-20 DIAGNOSIS — Z87.828 HISTORY OF LACERATION OF SKIN: ICD-10-CM

## 2024-08-20 DIAGNOSIS — Z23 NEED FOR VACCINATION: ICD-10-CM

## 2024-08-20 DIAGNOSIS — I10 ESSENTIAL HYPERTENSION: ICD-10-CM

## 2024-08-20 DIAGNOSIS — N30.01 ACUTE CYSTITIS WITH HEMATURIA: ICD-10-CM

## 2024-08-20 PROBLEM — Z01.419 WELL WOMAN EXAM WITH ROUTINE GYNECOLOGICAL EXAM: Status: RESOLVED | Noted: 2017-04-07 | Resolved: 2024-08-20

## 2024-08-20 LAB
AMBIGUOUS DTTM AMBI4: NORMAL
APPEARANCE UR: CLEAR
BILIRUB UR STRIP-MCNC: NEGATIVE MG/DL
COLOR UR AUTO: YELLOW
GLUCOSE UR STRIP.AUTO-MCNC: NEGATIVE MG/DL
KETONES UR STRIP.AUTO-MCNC: NEGATIVE MG/DL
LEUKOCYTE ESTERASE UR QL STRIP.AUTO: NORMAL
NITRITE UR QL STRIP.AUTO: NEGATIVE
PH UR STRIP.AUTO: 7 [PH] (ref 5–8)
PROT UR QL STRIP: NEGATIVE MG/DL
RBC UR QL AUTO: NORMAL
SP GR UR STRIP.AUTO: 1.02
UROBILINOGEN UR STRIP-MCNC: 1 MG/DL

## 2024-08-20 PROCEDURE — 3078F DIAST BP <80 MM HG: CPT | Performed by: NURSE PRACTITIONER

## 2024-08-20 PROCEDURE — 90471 IMMUNIZATION ADMIN: CPT | Performed by: NURSE PRACTITIONER

## 2024-08-20 PROCEDURE — 3075F SYST BP GE 130 - 139MM HG: CPT | Performed by: NURSE PRACTITIONER

## 2024-08-20 PROCEDURE — 90715 TDAP VACCINE 7 YRS/> IM: CPT | Performed by: NURSE PRACTITIONER

## 2024-08-20 PROCEDURE — 99214 OFFICE O/P EST MOD 30 MIN: CPT | Mod: 25 | Performed by: NURSE PRACTITIONER

## 2024-08-20 PROCEDURE — 87086 URINE CULTURE/COLONY COUNT: CPT

## 2024-08-20 PROCEDURE — 81002 URINALYSIS NONAUTO W/O SCOPE: CPT | Performed by: NURSE PRACTITIONER

## 2024-08-20 RX ORDER — SULFAMETHOXAZOLE/TRIMETHOPRIM 800-160 MG
1 TABLET ORAL 2 TIMES DAILY
Qty: 6 TABLET | Refills: 0 | Status: SHIPPED | OUTPATIENT
Start: 2024-08-20 | End: 2024-08-23

## 2024-08-20 RX ORDER — LISINOPRIL 10 MG/1
TABLET ORAL
Qty: 90 TABLET | Refills: 0 | Status: SHIPPED | OUTPATIENT
Start: 2024-08-20

## 2024-08-20 ASSESSMENT — FIBROSIS 4 INDEX: FIB4 SCORE: 1.67

## 2024-08-20 NOTE — PROGRESS NOTES
"Verbal consent was acquired by the patient to use [facility] ambient listening note generation during this visit Yes      Lan Farley is a 68 y.o. female who presents for dysuria and medication refills.  History of Present Illness  The patient presents for evaluation of multiple medical concerns.    She reports experiencing a burning sensation during urination, a symptom she has encountered previously. The previous episode was in March 2024 and associated with an infection that resolved after antibiotic treatment. However, the discomfort has since returned. She also mentions a slight burning sensation when wiping. Her last urinary tract infection (UTI) occurred in 03/2024, which was successfully treated with Bactrim. She maintains good hydration by drinking ample water and avoids soda. She also practices proper hygiene by wiping from front to back after urination.    She is seeking refills for her metformin and lisinopril. She has an upcoming establishing appointment with Dr. James in 11/2024.    She spends time around young children and gardening and is interested in receiving the tetanus vaccine. She has previously received the pneumonia and shingles vaccines.    ALLERGIES  She denies any allergies to antibiotics.    ROS:  See HPI    Objective   /64 (BP Location: Right arm, Patient Position: Sitting, BP Cuff Size: Adult)   Pulse 80   Temp 36.4 °C (97.5 °F) (Temporal)   Ht 1.461 m (4' 9.5\")   Wt 69.9 kg (154 lb)   SpO2 94%   Physical Exam  General: Well nourished, well developed female in NAD, awake and conversant.  Eyes: Normal conjunctiva, anicteric.  Round symmetrical pupils.  ENT: Hearing grossly intact.  No nasal discharge.  Neck: Neck is supple.  No masses or thyromegaly.  CV: No lower extremity edema.  Respiratory: Respirations are nonlabored.  No wheezing.  Abdomen: Non-Distended.  Skin: Warm.  No rashes or ulcers.  MSK: Normal ambulation.  No clubbing or cyanosis.  Neuro: Sensation " and CN II-XII grossly normal.  Psych: Alert and oriented.  Cooperative, appropriate mood and affect, normal judgment.      Results  Laboratory Studies  Urine test showed a little blood and a leukocytes.    Assessment & Plan  1. Acute cystitis with hematuria  2. Dysuria  New to examiner, ongoing for the patient. The presence of mild hematuria and leukocytes in the urine sample suggests a urinary tract infection. A urine culture will be sent for further analysis. Bactrim has been prescribed, to be taken as one pill twice daily for three days. The results of the culture will be communicated via [facility] once available. If the culture results indicate the need for a different antibiotic, a new prescription will be issued.  - POCT Urinalysis  - URINE CULTURE(NEW); Future  - sulfamethoxazole-trimethoprim (BACTRIM DS) 800-160 MG tablet; Take 1 Tablet by mouth 2 times a day for 3 days.  Dispense: 6 Tablet; Refill: 0    3. Type 2 diabetes mellitus without complication, without long-term current use of insulin (HCC)  New to examiner, chronic for patient.  Refill for metformin 1000 mg twice daily sent to pharmacy. Continue januvia 50 mg daily. Retinal screening completed today.  - metformin (GLUCOPHAGE) 1000 MG tablet; Take 1 Tablet by mouth 2 times a day with meals.  Dispense: 180 Tablet; Refill: 0  - POCT Retinal Eye Exam    4. Essential hypertension  New to examiner, chronic for patient. Continue lisinopril 20 mg daily, refill sent to the pharmacy.  - lisinopril (PRINIVIL) 10 MG Tab; TAKE ONE TABLET BY MOUTH ONE TIME DAILY  Dispense: 90 Tablet; Refill: 0    5. Need for vaccination  6. History of laceration of skin  Given today.  - Tdap Vaccine =>8YO IM     Return in about 13 weeks (around 11/19/2024) for Follow up with PCP. w    I have placed the below orders and discussed them with an approved delegating provider. The MA is performing the below orders under the direction of Dr. James.      Please note that this dictation  was created using voice recognition software. I have made every reasonable attempt to correct obvious errors, but I expect that there are errors of grammar and possibly content that I did not discover before finalizing the note.

## 2024-08-23 LAB
BACTERIA UR CULT: NORMAL
SIGNIFICANT IND 70042: NORMAL
SITE SITE: NORMAL
SOURCE SOURCE: NORMAL

## 2024-09-11 LAB — RETINAL SCREEN: NEGATIVE

## 2024-10-04 ENCOUNTER — DOCUMENTATION (OUTPATIENT)
Dept: HEALTH INFORMATION MANAGEMENT | Facility: OTHER | Age: 69
End: 2024-10-04
Payer: MEDICARE

## 2024-11-09 DIAGNOSIS — I10 ESSENTIAL HYPERTENSION: ICD-10-CM

## 2024-11-09 DIAGNOSIS — E11.9 TYPE 2 DIABETES MELLITUS WITHOUT COMPLICATION, WITHOUT LONG-TERM CURRENT USE OF INSULIN (HCC): ICD-10-CM

## 2024-11-11 RX ORDER — LISINOPRIL 10 MG/1
TABLET ORAL
Qty: 90 TABLET | Refills: 0 | Status: SHIPPED | OUTPATIENT
Start: 2024-11-11

## 2024-11-11 NOTE — TELEPHONE ENCOUNTER
Received request via: Pharmacy    Was the patient seen in the last year in this department? Yes    Does the patient have an active prescription (recently filled or refills available) for medication(s) requested? No    [facility]    Does the patient have senior living Plus and need 100-day supply? (This applies to ALL medications) Patient does not have SCP

## 2024-11-14 SDOH — ECONOMIC STABILITY: INCOME INSECURITY: HOW HARD IS IT FOR YOU TO PAY FOR THE VERY BASICS LIKE FOOD, HOUSING, MEDICAL CARE, AND HEATING?: NOT VERY HARD

## 2024-11-14 SDOH — ECONOMIC STABILITY: INCOME INSECURITY: IN THE LAST 12 MONTHS, WAS THERE A TIME WHEN YOU WERE NOT ABLE TO PAY THE MORTGAGE OR RENT ON TIME?: NO

## 2024-11-14 SDOH — ECONOMIC STABILITY: FOOD INSECURITY: WITHIN THE PAST 12 MONTHS, YOU WORRIED THAT YOUR FOOD WOULD RUN OUT BEFORE YOU GOT MONEY TO BUY MORE.: NEVER TRUE

## 2024-11-14 SDOH — HEALTH STABILITY: PHYSICAL HEALTH: ON AVERAGE, HOW MANY DAYS PER WEEK DO YOU ENGAGE IN MODERATE TO STRENUOUS EXERCISE (LIKE A BRISK WALK)?: 5 DAYS

## 2024-11-14 SDOH — HEALTH STABILITY: PHYSICAL HEALTH: ON AVERAGE, HOW MANY MINUTES DO YOU ENGAGE IN EXERCISE AT THIS LEVEL?: 40 MIN

## 2024-11-14 SDOH — ECONOMIC STABILITY: FOOD INSECURITY: WITHIN THE PAST 12 MONTHS, THE FOOD YOU BOUGHT JUST DIDN'T LAST AND YOU DIDN'T HAVE MONEY TO GET MORE.: NEVER TRUE

## 2024-11-14 ASSESSMENT — SOCIAL DETERMINANTS OF HEALTH (SDOH)
IN THE PAST 12 MONTHS, HAS THE ELECTRIC, GAS, OIL, OR WATER COMPANY THREATENED TO SHUT OFF SERVICE IN YOUR HOME?: NO
HOW OFTEN DO YOU HAVE A DRINK CONTAINING ALCOHOL: NEVER
IN A TYPICAL WEEK, HOW MANY TIMES DO YOU TALK ON THE PHONE WITH FAMILY, FRIENDS, OR NEIGHBORS?: MORE THAN THREE TIMES A WEEK
HOW OFTEN DO YOU ATTENT MEETINGS OF THE CLUB OR ORGANIZATION YOU BELONG TO?: NEVER
HOW HARD IS IT FOR YOU TO PAY FOR THE VERY BASICS LIKE FOOD, HOUSING, MEDICAL CARE, AND HEATING?: NOT VERY HARD
HOW MANY DRINKS CONTAINING ALCOHOL DO YOU HAVE ON A TYPICAL DAY WHEN YOU ARE DRINKING: PATIENT DOES NOT DRINK
IN A TYPICAL WEEK, HOW MANY TIMES DO YOU TALK ON THE PHONE WITH FAMILY, FRIENDS, OR NEIGHBORS?: MORE THAN THREE TIMES A WEEK
HOW OFTEN DO YOU ATTENT MEETINGS OF THE CLUB OR ORGANIZATION YOU BELONG TO?: NEVER
DO YOU BELONG TO ANY CLUBS OR ORGANIZATIONS SUCH AS CHURCH GROUPS UNIONS, FRATERNAL OR ATHLETIC GROUPS, OR SCHOOL GROUPS?: NO
WITHIN THE PAST 12 MONTHS, YOU WORRIED THAT YOUR FOOD WOULD RUN OUT BEFORE YOU GOT THE MONEY TO BUY MORE: NEVER TRUE
DO YOU BELONG TO ANY CLUBS OR ORGANIZATIONS SUCH AS CHURCH GROUPS UNIONS, FRATERNAL OR ATHLETIC GROUPS, OR SCHOOL GROUPS?: NO
HOW OFTEN DO YOU ATTEND CHURCH OR RELIGIOUS SERVICES?: NEVER
HOW OFTEN DO YOU GET TOGETHER WITH FRIENDS OR RELATIVES?: ONCE A WEEK
HOW OFTEN DO YOU GET TOGETHER WITH FRIENDS OR RELATIVES?: ONCE A WEEK
HOW OFTEN DO YOU ATTEND CHURCH OR RELIGIOUS SERVICES?: NEVER
HOW OFTEN DO YOU HAVE SIX OR MORE DRINKS ON ONE OCCASION: NEVER

## 2024-11-14 ASSESSMENT — LIFESTYLE VARIABLES
HOW MANY STANDARD DRINKS CONTAINING ALCOHOL DO YOU HAVE ON A TYPICAL DAY: PATIENT DOES NOT DRINK
SKIP TO QUESTIONS 9-10: 1
AUDIT-C TOTAL SCORE: 0
HOW OFTEN DO YOU HAVE SIX OR MORE DRINKS ON ONE OCCASION: NEVER
HOW OFTEN DO YOU HAVE A DRINK CONTAINING ALCOHOL: NEVER

## 2024-11-19 ENCOUNTER — OFFICE VISIT (OUTPATIENT)
Dept: MEDICAL GROUP | Facility: PHYSICIAN GROUP | Age: 69
End: 2024-11-19
Payer: MEDICARE

## 2024-11-19 VITALS
DIASTOLIC BLOOD PRESSURE: 80 MMHG | HEIGHT: 57 IN | WEIGHT: 155 LBS | HEART RATE: 82 BPM | SYSTOLIC BLOOD PRESSURE: 126 MMHG | TEMPERATURE: 96.9 F | BODY MASS INDEX: 33.44 KG/M2 | OXYGEN SATURATION: 94 %

## 2024-11-19 DIAGNOSIS — R71.8 MICROCYTOSIS: ICD-10-CM

## 2024-11-19 DIAGNOSIS — E78.1 HYPERTRIGLYCERIDEMIA: ICD-10-CM

## 2024-11-19 DIAGNOSIS — L40.50 PSORIATIC ARTHRITIS (HCC): ICD-10-CM

## 2024-11-19 DIAGNOSIS — Z12.11 COLON CANCER SCREENING: ICD-10-CM

## 2024-11-19 DIAGNOSIS — E11.9 TYPE 2 DIABETES MELLITUS WITHOUT COMPLICATION, WITHOUT LONG-TERM CURRENT USE OF INSULIN (HCC): ICD-10-CM

## 2024-11-19 DIAGNOSIS — L40.9 PSORIASIS: ICD-10-CM

## 2024-11-19 DIAGNOSIS — Z76.89 ENCOUNTER TO ESTABLISH CARE: ICD-10-CM

## 2024-11-19 DIAGNOSIS — I10 ESSENTIAL HYPERTENSION: ICD-10-CM

## 2024-11-19 DIAGNOSIS — Z78.0 POSTMENOPAUSAL: ICD-10-CM

## 2024-11-19 DIAGNOSIS — D12.6 ADENOMATOUS POLYP OF COLON, UNSPECIFIED PART OF COLON: ICD-10-CM

## 2024-11-19 PROBLEM — M79.675 PAIN IN TOES OF BOTH FEET: Status: RESOLVED | Noted: 2021-04-19 | Resolved: 2024-11-19

## 2024-11-19 PROBLEM — M79.674 PAIN IN TOES OF BOTH FEET: Status: RESOLVED | Noted: 2021-04-19 | Resolved: 2024-11-19

## 2024-11-19 PROCEDURE — 3074F SYST BP LT 130 MM HG: CPT | Performed by: STUDENT IN AN ORGANIZED HEALTH CARE EDUCATION/TRAINING PROGRAM

## 2024-11-19 PROCEDURE — 3079F DIAST BP 80-89 MM HG: CPT | Performed by: STUDENT IN AN ORGANIZED HEALTH CARE EDUCATION/TRAINING PROGRAM

## 2024-11-19 PROCEDURE — 99214 OFFICE O/P EST MOD 30 MIN: CPT | Performed by: STUDENT IN AN ORGANIZED HEALTH CARE EDUCATION/TRAINING PROGRAM

## 2024-11-19 ASSESSMENT — ENCOUNTER SYMPTOMS
FEVER: 0
CHILLS: 0
PALPITATIONS: 0
SHORTNESS OF BREATH: 0

## 2024-11-19 ASSESSMENT — FIBROSIS 4 INDEX: FIB4 SCORE: 1.69

## 2024-11-19 NOTE — PROGRESS NOTES
Subjective:   Verbal consent was acquired by the patient to use [employer] ambient listening note generation during this visit Yes     CC:  Diagnoses of Encounter to establish care, Type 2 diabetes mellitus without complication, without long-term current use of insulin (HCC), Psoriatic arthritis (HCC), Psoriasis, Hypertriglyceridemia, Essential hypertension, Microcytosis, Colon cancer screening, Adenomatous polyp of colon, unspecified part of colon, and Postmenopausal were pertinent to this visit.    History of Present Illness  Ms. Farley is a pleasant 68 yo who presents today to establish care.     She reports no current health concerns.    Her diabetes is managed with metformin 1000 mg twice daily and Januvia 50 mg, which she tolerates well. Her blood sugar levels are well controlled.    Her hypertension is managed with lisinopril 10 mg.    She receives bi-weekly injections for her psoriasis and is under the care of a rheumatologist.    She is on medication for high cholesterol.    She has a history of colon polyps, discovered during a colonoscopy 10 to 15 years ago, and is due for another colonoscopy soon.    She reports no current foot pain.    A bone scan conducted years ago revealed osteopenia, but she currently experiences no symptoms.    She undergoes annual eye examinations.    She has had her tonsils removed and has undergone cataract surgery.    She has one refill left for all her medications, which are dispensed every three months.    She has received the influenza vaccine and is considering the COVID-19 vaccine.    SOCIAL HISTORY  She smokes 1 or 2 cigarettes a day. She started smoking when she was a teenager. She drinks alcohol only on special occasions. She denies any history of drug use.    FAMILY HISTORY  Her brother had diabetes. Her older sister passed away 3 years ago from complications of diabetes. She denies any family history of colon cancer or breast cancer.    Patient Active Problem List     "Diagnosis Date Noted    Psoriasis 11/19/2024    Microcytosis 09/24/2022    Obesity (BMI 30-39.9) 11/03/2017    Essential hypertension 08/08/2016    Type 2 diabetes mellitus without complication, without long-term current use of insulin (HCC) 10/20/2015    Hypertriglyceridemia 05/09/2013    Colon polyp 04/24/2013    Osteopenia 04/24/2013    Psoriatic arthritis (HCC)        Health Maintenance: Completed    ROS:   Review of Systems   Constitutional:  Negative for chills and fever.   Respiratory:  Negative for shortness of breath.    Cardiovascular:  Negative for chest pain and palpitations.         Objective:     Exam: /80 (BP Location: Left arm, Patient Position: Sitting, BP Cuff Size: Adult)   Pulse 82   Temp 36.1 °C (96.9 °F)   Ht 1.448 m (4' 9\")   Wt 70.3 kg (155 lb)   SpO2 94%  Body mass index is 33.54 kg/m².    Physical Exam  Constitutional:       Appearance: Normal appearance.   Cardiovascular:      Rate and Rhythm: Normal rate and regular rhythm.      Pulses:           Dorsalis pedis pulses are 2+ on the right side and 2+ on the left side.      Heart sounds: Normal heart sounds.   Pulmonary:      Effort: Pulmonary effort is normal. No respiratory distress.      Breath sounds: Normal breath sounds. No stridor. No wheezing, rhonchi or rales.   Musculoskeletal:      Right foot: Normal range of motion. No deformity or bunion.      Left foot: Normal range of motion. No deformity or bunion.   Feet:      Right foot:      Protective Sensation: 8 sites tested.  8 sites sensed.      Skin integrity: Skin integrity normal. No ulcer, blister or skin breakdown.      Toenail Condition: Right toenails are normal.      Left foot:      Protective Sensation: 8 sites tested.  8 sites sensed.      Skin integrity: Skin integrity normal. No ulcer, blister or skin breakdown.      Toenail Condition: Left toenails are normal.   Neurological:      Mental Status: She is alert.               Assessment & Plan:   69 y.o. female " with the following -    1. Encounter to establish care  Patient presents today to establish care. Chart was reviewed and history was discussed in detail with the patient.    2. Type 2 diabetes mellitus without complication, without long-term current use of insulin (HCC)  Chronic, controlled.  A1c from 6/2024 was 6.8%.  Patient is currently on Januvia 50 mg and metformin 1000 mg twice daily.  Monofilament exam completed today with normal findings.  Order provided for A1c, urine microalbumin, and lipid panel.  Will continue Januvia 50 mg daily and metformin 1000 mg twice daily for now.  - HEMOGLOBIN A1C; Future  - MICROALBUMIN CREAT RATIO URINE; Future  - Diabetic Monofilament LE Exam    3. Psoriatic arthritis (HCC)  4. Psoriasis  Chronic, stable.  Patient is established with rheumatology and receives Enbrel injections.    5. Hypertriglyceridemia  Chronic, stable.  Lipid panel from 12/2023 with elevated triglycerides.  Patient is currently on lovastatin 20 mg daily.  - Lipid Profile; Future    6. Essential hypertension  Chronic, controlled.  Blood pressure at goal today.  Continue lisinopril 10 mg daily.  - Comp Metabolic Panel; Future    7. Microcytosis  Chronic, stable.  Per chart review patient has had microcytosis without anemia for the past several years.  Iron panel and ferritin from 2022 were normal.  - CBC WITH DIFFERENTIAL; Future    8. Colon cancer screening  - Referral to GI for Colonoscopy    9. Adenomatous polyp of colon, unspecified part of colon  Per chart review patient has history of colon polyps. There were no records of colonoscopy found in patients chart. I will place referral for colonoscopy.  - Referral to GI for Colonoscopy    10. Postmenopausal  - DS-BONE DENSITY STUDY (DEXA); Future          I spent a total of 31 minutes with record review, exam, communication with the patient, communication with other providers, and documentation of this encounter.    Return in about 2 months (around  1/19/2025) for Chronic Conditions, Labs.    Please note that this dictation was created using voice recognition software. I have made every reasonable attempt to correct obvious errors, but I expect that there are errors of grammar and possibly content that I did not discover before finalizing the note.

## 2024-12-27 ENCOUNTER — APPOINTMENT (OUTPATIENT)
Dept: RADIOLOGY | Facility: MEDICAL CENTER | Age: 69
End: 2024-12-27
Attending: STUDENT IN AN ORGANIZED HEALTH CARE EDUCATION/TRAINING PROGRAM
Payer: MEDICARE

## 2025-01-07 ENCOUNTER — HOSPITAL ENCOUNTER (OUTPATIENT)
Dept: RADIOLOGY | Facility: MEDICAL CENTER | Age: 70
End: 2025-01-07
Attending: STUDENT IN AN ORGANIZED HEALTH CARE EDUCATION/TRAINING PROGRAM
Payer: MEDICARE

## 2025-01-07 DIAGNOSIS — Z78.0 POSTMENOPAUSAL: ICD-10-CM

## 2025-01-07 PROCEDURE — 77080 DXA BONE DENSITY AXIAL: CPT

## 2025-01-10 ENCOUNTER — HOSPITAL ENCOUNTER (OUTPATIENT)
Dept: LAB | Facility: MEDICAL CENTER | Age: 70
End: 2025-01-10
Attending: STUDENT IN AN ORGANIZED HEALTH CARE EDUCATION/TRAINING PROGRAM
Payer: MEDICARE

## 2025-01-10 DIAGNOSIS — E11.9 TYPE 2 DIABETES MELLITUS WITHOUT COMPLICATION, WITHOUT LONG-TERM CURRENT USE OF INSULIN (HCC): ICD-10-CM

## 2025-01-10 DIAGNOSIS — I10 ESSENTIAL HYPERTENSION: ICD-10-CM

## 2025-01-10 DIAGNOSIS — R71.8 MICROCYTOSIS: ICD-10-CM

## 2025-01-10 DIAGNOSIS — E78.1 HYPERTRIGLYCERIDEMIA: ICD-10-CM

## 2025-01-10 LAB
ALBUMIN SERPL BCP-MCNC: 4.8 G/DL (ref 3.2–4.9)
ALBUMIN/GLOB SERPL: 1.6 G/DL
ALP SERPL-CCNC: 57 U/L (ref 30–99)
ALT SERPL-CCNC: 8 U/L (ref 2–50)
ANION GAP SERPL CALC-SCNC: 11 MMOL/L (ref 7–16)
AST SERPL-CCNC: 21 U/L (ref 12–45)
BASOPHILS # BLD AUTO: 0.6 % (ref 0–1.8)
BASOPHILS # BLD: 0.04 K/UL (ref 0–0.12)
BILIRUB SERPL-MCNC: 0.6 MG/DL (ref 0.1–1.5)
BUN SERPL-MCNC: 15 MG/DL (ref 8–22)
CALCIUM ALBUM COR SERPL-MCNC: 9.2 MG/DL (ref 8.5–10.5)
CALCIUM SERPL-MCNC: 9.8 MG/DL (ref 8.5–10.5)
CHLORIDE SERPL-SCNC: 101 MMOL/L (ref 96–112)
CHOLEST SERPL-MCNC: 109 MG/DL (ref 100–199)
CO2 SERPL-SCNC: 26 MMOL/L (ref 20–33)
CREAT SERPL-MCNC: 0.58 MG/DL (ref 0.5–1.4)
CREAT UR-MCNC: 15.65 MG/DL
EOSINOPHIL # BLD AUTO: 0.02 K/UL (ref 0–0.51)
EOSINOPHIL NFR BLD: 0.3 % (ref 0–6.9)
ERYTHROCYTE [DISTWIDTH] IN BLOOD BY AUTOMATED COUNT: 36.6 FL (ref 35.9–50)
EST. AVERAGE GLUCOSE BLD GHB EST-MCNC: 128 MG/DL
FASTING STATUS PATIENT QL REPORTED: NORMAL
GFR SERPLBLD CREATININE-BSD FMLA CKD-EPI: 98 ML/MIN/1.73 M 2
GLOBULIN SER CALC-MCNC: 3 G/DL (ref 1.9–3.5)
GLUCOSE SERPL-MCNC: 125 MG/DL (ref 65–99)
HBA1C MFR BLD: 6.1 % (ref 4–5.6)
HCT VFR BLD AUTO: 40.4 % (ref 37–47)
HDLC SERPL-MCNC: 51 MG/DL
HGB BLD-MCNC: 13 G/DL (ref 12–16)
IMM GRANULOCYTES # BLD AUTO: 0.02 K/UL (ref 0–0.11)
IMM GRANULOCYTES NFR BLD AUTO: 0.3 % (ref 0–0.9)
LDLC SERPL CALC-MCNC: 26 MG/DL
LYMPHOCYTES # BLD AUTO: 1.79 K/UL (ref 1–4.8)
LYMPHOCYTES NFR BLD: 27.9 % (ref 22–41)
MCH RBC QN AUTO: 21.7 PG (ref 27–33)
MCHC RBC AUTO-ENTMCNC: 32.2 G/DL (ref 32.2–35.5)
MCV RBC AUTO: 67.3 FL (ref 81.4–97.8)
MICROALBUMIN UR-MCNC: <1.2 MG/DL
MICROALBUMIN/CREAT UR: NORMAL MG/G (ref 0–30)
MONOCYTES # BLD AUTO: 0.42 K/UL (ref 0–0.85)
MONOCYTES NFR BLD AUTO: 6.6 % (ref 0–13.4)
NEUTROPHILS # BLD AUTO: 4.12 K/UL (ref 1.82–7.42)
NEUTROPHILS NFR BLD: 64.3 % (ref 44–72)
NRBC # BLD AUTO: 0 K/UL
NRBC BLD-RTO: 0 /100 WBC (ref 0–0.2)
PLATELET # BLD AUTO: 241 K/UL (ref 164–446)
PMV BLD AUTO: 10.2 FL (ref 9–12.9)
POTASSIUM SERPL-SCNC: 4.5 MMOL/L (ref 3.6–5.5)
PROT SERPL-MCNC: 7.8 G/DL (ref 6–8.2)
RBC # BLD AUTO: 6 M/UL (ref 4.2–5.4)
SODIUM SERPL-SCNC: 138 MMOL/L (ref 135–145)
TRIGL SERPL-MCNC: 162 MG/DL (ref 0–149)
WBC # BLD AUTO: 6.4 K/UL (ref 4.8–10.8)

## 2025-01-10 PROCEDURE — 82570 ASSAY OF URINE CREATININE: CPT

## 2025-01-10 PROCEDURE — 80061 LIPID PANEL: CPT

## 2025-01-10 PROCEDURE — 82043 UR ALBUMIN QUANTITATIVE: CPT

## 2025-01-10 PROCEDURE — 80053 COMPREHEN METABOLIC PANEL: CPT

## 2025-01-10 PROCEDURE — 83036 HEMOGLOBIN GLYCOSYLATED A1C: CPT | Mod: GA

## 2025-01-10 PROCEDURE — 36415 COLL VENOUS BLD VENIPUNCTURE: CPT

## 2025-01-10 PROCEDURE — 85025 COMPLETE CBC W/AUTO DIFF WBC: CPT

## 2025-01-16 ENCOUNTER — OFFICE VISIT (OUTPATIENT)
Dept: MEDICAL GROUP | Facility: PHYSICIAN GROUP | Age: 70
End: 2025-01-16
Payer: MEDICARE

## 2025-01-16 ENCOUNTER — HOSPITAL ENCOUNTER (OUTPATIENT)
Facility: MEDICAL CENTER | Age: 70
End: 2025-01-16
Attending: STUDENT IN AN ORGANIZED HEALTH CARE EDUCATION/TRAINING PROGRAM
Payer: MEDICARE

## 2025-01-16 ENCOUNTER — PATIENT MESSAGE (OUTPATIENT)
Dept: MEDICAL GROUP | Facility: PHYSICIAN GROUP | Age: 70
End: 2025-01-16

## 2025-01-16 VITALS
DIASTOLIC BLOOD PRESSURE: 82 MMHG | OXYGEN SATURATION: 95 % | HEART RATE: 83 BPM | HEIGHT: 57 IN | SYSTOLIC BLOOD PRESSURE: 120 MMHG | TEMPERATURE: 97.2 F | WEIGHT: 155 LBS | BODY MASS INDEX: 33.44 KG/M2

## 2025-01-16 DIAGNOSIS — R30.0 DYSURIA: ICD-10-CM

## 2025-01-16 DIAGNOSIS — L40.50 PSORIATIC ARTHRITIS (HCC): ICD-10-CM

## 2025-01-16 DIAGNOSIS — E11.9 TYPE 2 DIABETES MELLITUS WITHOUT COMPLICATION, WITHOUT LONG-TERM CURRENT USE OF INSULIN (HCC): ICD-10-CM

## 2025-01-16 DIAGNOSIS — I10 ESSENTIAL HYPERTENSION: ICD-10-CM

## 2025-01-16 DIAGNOSIS — E66.9 OBESITY (BMI 30-39.9): ICD-10-CM

## 2025-01-16 DIAGNOSIS — R71.8 MICROCYTOSIS: ICD-10-CM

## 2025-01-16 LAB
APPEARANCE UR: CLEAR
BILIRUB UR QL STRIP.AUTO: NEGATIVE
COLOR UR: YELLOW
GLUCOSE UR STRIP.AUTO-MCNC: NEGATIVE MG/DL
KETONES UR STRIP.AUTO-MCNC: NEGATIVE MG/DL
LEUKOCYTE ESTERASE UR QL STRIP.AUTO: NEGATIVE
MICRO URNS: NORMAL
NITRITE UR QL STRIP.AUTO: NEGATIVE
PH UR STRIP.AUTO: 7 [PH] (ref 5–8)
PROT UR QL STRIP: NEGATIVE MG/DL
RBC UR QL AUTO: NEGATIVE
SP GR UR STRIP.AUTO: 1.01
UROBILINOGEN UR STRIP.AUTO-MCNC: 1 EU/DL

## 2025-01-16 PROCEDURE — 3074F SYST BP LT 130 MM HG: CPT | Performed by: STUDENT IN AN ORGANIZED HEALTH CARE EDUCATION/TRAINING PROGRAM

## 2025-01-16 PROCEDURE — 99214 OFFICE O/P EST MOD 30 MIN: CPT | Performed by: STUDENT IN AN ORGANIZED HEALTH CARE EDUCATION/TRAINING PROGRAM

## 2025-01-16 PROCEDURE — 81003 URINALYSIS AUTO W/O SCOPE: CPT

## 2025-01-16 PROCEDURE — 3079F DIAST BP 80-89 MM HG: CPT | Performed by: STUDENT IN AN ORGANIZED HEALTH CARE EDUCATION/TRAINING PROGRAM

## 2025-01-16 ASSESSMENT — ENCOUNTER SYMPTOMS
CHILLS: 0
SHORTNESS OF BREATH: 0
FEVER: 0
PALPITATIONS: 0

## 2025-01-16 ASSESSMENT — FIBROSIS 4 INDEX: FIB4 SCORE: 2.13

## 2025-01-16 ASSESSMENT — PATIENT HEALTH QUESTIONNAIRE - PHQ9: CLINICAL INTERPRETATION OF PHQ2 SCORE: 0

## 2025-01-16 NOTE — PROGRESS NOTES
Subjective:   Verbal consent was acquired by the patient to use [employer] ambient listening note generation during this visit Yes     CC: Follow-up on labs    History of Present Illness  Ms. Farley is a pleasant 69-year-old who presents today for follow-up on lab work.    She has a history of iron deficiency but has not been on iron supplementation for the past 12 years.    She reports intermittent dysuria, which she typically disregards as it tends to resolve spontaneously. She experienced a burning sensation during urination last night, which has since subsided. This is her third episode of such symptoms, with the previous two instances being severe enough to warrant urgent care visits. She does not report any vaginal discharge.    She is not currently receiving any treatment for bone density issues. She maintains a regimen of calcium, vitamin D, and a multivitamin supplement.    She has a known history of diabetes. She is currently on Januvia 50 mg and metformin 1000 mg twice daily.    She has received all her vaccines. She has already received 4 COVID-19 vaccines and does not want any more.    She has rheumatoid arthritis. She takes injections every 2 weeks.    MEDICATIONS  Januvia, metformin, calcium, vitamin D.    IMMUNIZATIONS  She has received 4 doses of the COVID-19 vaccine.    Patient Active Problem List    Diagnosis Date Noted    Psoriasis 11/19/2024    Microcytosis 09/24/2022    Obesity (BMI 30-39.9) 11/03/2017    Essential hypertension 08/08/2016    Type 2 diabetes mellitus without complication, without long-term current use of insulin (HCC) 10/20/2015    Hypertriglyceridemia 05/09/2013    Colon polyp 04/24/2013    Psoriatic arthritis ([facility])          Health Maintenance: Completed    ROS:  Review of Systems   Constitutional:  Negative for chills and fever.   Respiratory:  Negative for shortness of breath.    Cardiovascular:  Negative for chest pain and palpitations.       Objective:     Exam:  /82  "(BP Location: Left arm, Patient Position: Sitting, BP Cuff Size: Adult)   Pulse 83   Temp 36.2 °C (97.2 °F) (Temporal)   Ht 1.448 m (4' 9\")   Wt 70.3 kg (155 lb)   SpO2 95%   BMI 33.54 kg/m²  Body mass index is 33.54 kg/m².    Physical Exam  Constitutional:       Appearance: Normal appearance.   Eyes:      Extraocular Movements: Extraocular movements intact.   Neurological:      Mental Status: She is alert.   Psychiatric:         Mood and Affect: Mood normal.             Labs:    Latest Reference Range & Units 01/10/25 07:14   WBC 4.8 - 10.8 K/uL 6.4   RBC 4.20 - 5.40 M/uL 6.00 (H)   Hemoglobin 12.0 - 16.0 g/dL 13.0   Hematocrit 37.0 - 47.0 % 40.4   MCV 81.4 - 97.8 fL 67.3 (L)   MCH 27.0 - 33.0 pg 21.7 (L)   MCHC 32.2 - 35.5 g/dL 32.2   RDW 35.9 - 50.0 fL 36.6   Platelet Count 164 - 446 K/uL 241   MPV 9.0 - 12.9 fL 10.2   Neutrophils-Polys 44.00 - 72.00 % 64.30   Neutrophils (Absolute) 1.82 - 7.42 K/uL 4.12   Lymphocytes 22.00 - 41.00 % 27.90   Lymphs (Absolute) 1.00 - 4.80 K/uL 1.79   Monocytes 0.00 - 13.40 % 6.60   Monos (Absolute) 0.00 - 0.85 K/uL 0.42   Eosinophils 0.00 - 6.90 % 0.30   Eos (Absolute) 0.00 - 0.51 K/uL 0.02   Basophils 0.00 - 1.80 % 0.60   Baso (Absolute) 0.00 - 0.12 K/uL 0.04   Immature Granulocytes 0.00 - 0.90 % 0.30   Immature Granulocytes (abs) 0.00 - 0.11 K/uL 0.02   Nucleated RBC 0.00 - 0.20 /100 WBC 0.00   NRBC (Absolute) K/uL 0.00   Sodium 135 - 145 mmol/L 138   Potassium 3.6 - 5.5 mmol/L 4.5   Chloride 96 - 112 mmol/L 101   Co2 20 - 33 mmol/L 26   Anion Gap 7.0 - 16.0  11.0   Glucose 65 - 99 mg/dL 125 (H)   Bun 8 - 22 mg/dL 15   Creatinine 0.50 - 1.40 mg/dL 0.58   GFR (CKD-EPI) >60 mL/min/1.73 m 2 98   Calcium 8.5 - 10.5 mg/dL 9.8   Correct Calcium 8.5 - 10.5 mg/dL 9.2   AST(SGOT) 12 - 45 U/L 21   ALT(SGPT) 2 - 50 U/L 8   Alkaline Phosphatase 30 - 99 U/L 57   Total Bilirubin 0.1 - 1.5 mg/dL 0.6   Albumin 3.2 - 4.9 g/dL 4.8   Total Protein 6.0 - 8.2 g/dL 7.8   Globulin 1.9 - 3.5 g/dL " 3.0   A-G Ratio g/dL 1.6   Glycohemoglobin 4.0 - 5.6 % 6.1 (H)   Estim. Avg Glu mg/dL 128   Fasting Status  Fasting   Cholesterol,Tot 100 - 199 mg/dL 109   Triglycerides 0 - 149 mg/dL 162 (H)   HDL >=40 mg/dL 51   LDL <100 mg/dL 26   Micro Alb Creat Ratio 0 - 30 mg/g see below   Creatinine, Urine mg/dL 15.65   Microalbumin, Urine Random mg/dL <1.2   (H): Data is abnormally high  (L): Data is abnormally low    Assessment & Plan:     69 y.o. female with the following -     1. Essential hypertension  Chronic, controlled.  Blood pressure at goal today.  Continue lisinopril 10 mg daily    2. Type 2 diabetes mellitus without complication, without long-term current use of insulin (HCC)  Chronic, improving.  A1c was 6.1%.  Urine microalbumin normal.  Patient is up-to-date on retinal and monofilament exam.  Continue metformin 1000 mg twice daily and Januvia 50 mg daily.    3. Psoriatic arthritis (HCC)  Chronic, stable.  Patient is established with rheumatology.  She gets Enbrel injections.    4. Microcytosis  Chronic, stable.  Patient would not like further workup for macrocytosis.    5. Dysuria  Cute, ongoing.  Patient reports having dysuria.  UA was collected through lab and was negative for UTI.  - URINALYSIS,CULTURE IF INDICATED; Future    6. Obesity (BMI 30-39.9)  Chronic, ongoing.  Healthy lifestyle modifications recommended.          Return in about 6 months (around 7/16/2025) for Chronic Conditions.    Please note that this dictation was created using voice recognition software. I have made every reasonable attempt to correct obvious errors, but I expect that there are errors of grammar and possibly content that I did not discover before finalizing the note.

## 2025-01-20 ENCOUNTER — APPOINTMENT (OUTPATIENT)
Dept: MEDICAL GROUP | Facility: PHYSICIAN GROUP | Age: 70
End: 2025-01-20
Payer: MEDICARE

## 2025-01-22 DIAGNOSIS — E11.9 TYPE 2 DIABETES MELLITUS WITHOUT COMPLICATION, WITHOUT LONG-TERM CURRENT USE OF INSULIN (HCC): ICD-10-CM

## 2025-01-22 NOTE — TELEPHONE ENCOUNTER
Received request via: Pharmacy    Was the patient seen in the last year in this department? Yes    Does the patient have an active prescription (recently filled or refills available) for medication(s) requested? No    [facility]    Does the patient have FDC Plus and need 100-day supply? (This applies to ALL medications) Patient does not have SCP

## 2025-02-08 DIAGNOSIS — I10 ESSENTIAL HYPERTENSION: ICD-10-CM

## 2025-02-10 RX ORDER — LISINOPRIL 10 MG/1
TABLET ORAL
Qty: 90 TABLET | Refills: 0 | Status: SHIPPED | OUTPATIENT
Start: 2025-02-10

## 2025-02-10 NOTE — TELEPHONE ENCOUNTER
Received request via: Pharmacy    Was the patient seen in the last year in this department? Yes    Does the patient have an active prescription (recently filled or refills available) for medication(s) requested? No    [facility]    Does the patient have [facility] Plus and need 100-day supply? (This applies to ALL medications) Patient does not have SCP

## 2025-02-11 NOTE — TELEPHONE ENCOUNTER
Requested Prescriptions     Pending Prescriptions Disp Refills    lisinopril (PRINIVIL) 10 MG Tab [Pharmacy Med Name: Lisinopril 10 Mg Tab Lupi] 90 Tablet 0     Sig: TAKE ONE TABLET BY MOUTH ONE TIME DAILY       DIPTI Ruffin.

## 2025-04-19 DIAGNOSIS — I10 ESSENTIAL HYPERTENSION: ICD-10-CM

## 2025-04-21 RX ORDER — LISINOPRIL 10 MG/1
10 TABLET ORAL DAILY
Qty: 90 TABLET | Refills: 0 | Status: SHIPPED | OUTPATIENT
Start: 2025-04-21

## 2025-04-21 NOTE — TELEPHONE ENCOUNTER
Received request via: Pharmacy    Was the patient seen in the last year in this department? Yes    Does the patient have an active prescription (recently filled or refills available) for medication(s) requested? No    [facility]    Does the patient have [facility] Plus and need 100-day supply? (This applies to ALL medications) Patient does not have SCP

## 2025-06-06 DIAGNOSIS — E11.65 UNCONTROLLED TYPE 2 DIABETES MELLITUS WITH HYPERGLYCEMIA (HCC): ICD-10-CM

## 2025-06-06 RX ORDER — SITAGLIPTIN 50 MG/1
50 TABLET, FILM COATED ORAL DAILY
Qty: 90 TABLET | Refills: 0 | Status: SHIPPED | OUTPATIENT
Start: 2025-06-06

## 2025-06-06 NOTE — TELEPHONE ENCOUNTER
Received request via: Pharmacy    Was the patient seen in the last year in this department? Yes    Does the patient have an active prescription (recently filled or refills available) for medication(s) requested? No    [facility]    Does the patient have alf Plus and need 100-day supply? (This applies to ALL medications) Patient does not have SCP

## 2025-06-13 RX ORDER — ROSUVASTATIN CALCIUM 20 MG/1
TABLET, COATED ORAL
Qty: 90 TABLET | Refills: 0 | Status: SHIPPED | OUTPATIENT
Start: 2025-06-13

## 2025-06-16 ENCOUNTER — PATIENT MESSAGE (OUTPATIENT)
Dept: MEDICAL GROUP | Facility: PHYSICIAN GROUP | Age: 70
End: 2025-06-16
Payer: MEDICARE

## 2025-06-16 DIAGNOSIS — E11.9 TYPE 2 DIABETES MELLITUS WITHOUT COMPLICATION, WITHOUT LONG-TERM CURRENT USE OF INSULIN (HCC): Primary | ICD-10-CM

## 2025-06-16 RX ORDER — AVOBENZONE, HOMOSALATE, OCTISALATE, OCTOCRYLENE 30; 40; 45; 26 MG/ML; MG/ML; MG/ML; MG/ML
CREAM TOPICAL
Qty: 100 EACH | Refills: 3 | Status: SHIPPED | OUTPATIENT
Start: 2025-06-16

## 2025-07-16 ENCOUNTER — OFFICE VISIT (OUTPATIENT)
Dept: MEDICAL GROUP | Facility: PHYSICIAN GROUP | Age: 70
End: 2025-07-16
Payer: MEDICARE

## 2025-07-16 VITALS
BODY MASS INDEX: 32.36 KG/M2 | DIASTOLIC BLOOD PRESSURE: 68 MMHG | TEMPERATURE: 98.2 F | HEIGHT: 57 IN | WEIGHT: 150 LBS | OXYGEN SATURATION: 96 % | HEART RATE: 66 BPM | SYSTOLIC BLOOD PRESSURE: 120 MMHG

## 2025-07-16 DIAGNOSIS — E66.9 OBESITY (BMI 30-39.9): ICD-10-CM

## 2025-07-16 DIAGNOSIS — I10 ESSENTIAL HYPERTENSION: ICD-10-CM

## 2025-07-16 DIAGNOSIS — Z12.11 COLON CANCER SCREENING: ICD-10-CM

## 2025-07-16 DIAGNOSIS — L40.50 PSORIATIC ARTHRITIS (HCC): ICD-10-CM

## 2025-07-16 DIAGNOSIS — Z12.31 ENCOUNTER FOR SCREENING MAMMOGRAM FOR MALIGNANT NEOPLASM OF BREAST: ICD-10-CM

## 2025-07-16 DIAGNOSIS — E11.9 TYPE 2 DIABETES MELLITUS WITHOUT COMPLICATION, WITHOUT LONG-TERM CURRENT USE OF INSULIN (HCC): Primary | ICD-10-CM

## 2025-07-16 DIAGNOSIS — D12.6 ADENOMATOUS POLYP OF COLON, UNSPECIFIED PART OF COLON: ICD-10-CM

## 2025-07-16 LAB
HBA1C MFR BLD: 6.8 % (ref ?–5.8)
POCT INT CON NEG: NEGATIVE
POCT INT CON POS: POSITIVE

## 2025-07-16 PROCEDURE — 83036 HEMOGLOBIN GLYCOSYLATED A1C: CPT | Performed by: STUDENT IN AN ORGANIZED HEALTH CARE EDUCATION/TRAINING PROGRAM

## 2025-07-16 PROCEDURE — 3074F SYST BP LT 130 MM HG: CPT | Performed by: STUDENT IN AN ORGANIZED HEALTH CARE EDUCATION/TRAINING PROGRAM

## 2025-07-16 PROCEDURE — 99214 OFFICE O/P EST MOD 30 MIN: CPT | Performed by: STUDENT IN AN ORGANIZED HEALTH CARE EDUCATION/TRAINING PROGRAM

## 2025-07-16 PROCEDURE — G2211 COMPLEX E/M VISIT ADD ON: HCPCS | Performed by: STUDENT IN AN ORGANIZED HEALTH CARE EDUCATION/TRAINING PROGRAM

## 2025-07-16 PROCEDURE — 3078F DIAST BP <80 MM HG: CPT | Performed by: STUDENT IN AN ORGANIZED HEALTH CARE EDUCATION/TRAINING PROGRAM

## 2025-07-16 ASSESSMENT — FIBROSIS 4 INDEX: FIB4 SCORE: 2.13

## 2025-07-16 ASSESSMENT — ENCOUNTER SYMPTOMS
SHORTNESS OF BREATH: 0
FEVER: 0
PALPITATIONS: 0
CHILLS: 0

## 2025-07-16 NOTE — PROGRESS NOTES
Subjective:   Verbal consent was acquired by the patient to use [employer] ambient listening note generation during this visit Yes     CC: A1c    History of Present Illness  Ms. Farley is a pleasant 69-year-old who presents today for A1c check.    She reports no current health concerns. Her medication regimen includes Enbrel 50 mg injections every two weeks for rheumatoid arthritis, which she reports is well-managed. She is also on Januvia 50 mg daily, lisinopril 10 mg daily, metformin 1000 mg twice daily, and rosuvastatin 20 mg daily. She has an upcoming eye exam scheduled for next month as part of her diabetes management. She is due for a colonoscopy but has not yet secured an appointment. She declines the COVID-19 booster vaccine. Her diet excludes bread, but she consumes rice in moderation. She avoids sugary drinks, opting for sugar-free creamer in her coffee, and does not consume soda. Her fruit intake includes cantaloupe, banana, and honeydew.    Social History:  Diet: She excludes bread but consumes rice in moderation. She avoids sugary drinks, opting for sugar-free creamer in her coffee, and does not consume soda. Her fruit intake includes cantaloupe, banana, and honeydew.  Coffee/Tea/Caffeine-containing Drinks: She uses sugar-free creamer in her coffee.    Patient Active Problem List    Diagnosis Date Noted    Psoriasis 11/19/2024    Microcytosis 09/24/2022    Obesity (BMI 30-39.9) 11/03/2017    Essential hypertension 08/08/2016    Type 2 diabetes mellitus without complication, without long-term current use of insulin (HCC) 10/20/2015    Hypertriglyceridemia 05/09/2013    Colon polyp 04/24/2013    Psoriatic arthritis ([facility])          Health Maintenance: Completed    ROS:  Review of Systems   Constitutional:  Negative for chills and fever.   Respiratory:  Negative for shortness of breath.    Cardiovascular:  Negative for chest pain and palpitations.       Objective:     Exam:  /68 (BP Location: Left arm,  "Patient Position: Sitting, BP Cuff Size: Adult)   Pulse 66   Temp 36.8 °C (98.2 °F) (Temporal)   Ht 1.448 m (4' 9\")   Wt 68 kg (150 lb)   SpO2 96%   BMI 32.46 kg/m²  Body mass index is 32.46 kg/m².    Physical Exam  Constitutional:       Appearance: Normal appearance.   Cardiovascular:      Rate and Rhythm: Normal rate and regular rhythm.      Heart sounds: Normal heart sounds.   Pulmonary:      Effort: Pulmonary effort is normal. No respiratory distress.      Breath sounds: Normal breath sounds. No stridor. No wheezing, rhonchi or rales.   Neurological:      Mental Status: She is alert.             Labs:   Lab Results   Component Value Date/Time    HBA1C 6.8 (A) 07/16/2025 10:33 AM          Assessment & Plan:     69 y.o. female with the following -     1. Type 2 diabetes mellitus without complication, without long-term current use of insulin ([facility]) (Primary)  Chronic, controlled.  A1c has increased from 6.1% to 6.8% however remains controlled.  Patient is currently on Januvia 50 mg daily and metformin 1000 mg twice daily.  She is up-to-date on urine microalbumin, lipid panel, monofilament exam, and retinal screening.  Patient was advised to cut back on carbohydrates.  Will repeat A1c in 3 months, if it has increased then we will increase the dose of Januvia to 100 mg daily.  - POCT  A1C    2. Psoriatic arthritis (HCC)  Chronic, stable.  Continue Enbrel 50 mg every 2 weeks.    3. Encounter for screening mammogram for malignant neoplasm of breast  - MA-SCREENING MAMMO BILAT W/TOMOSYNTHESIS W/CAD; Future    4. Colon cancer screening  - Referral to GI for Colonoscopy    5. Essential hypertension  Chronic, controlled.  Blood pressure at goal today.  Continue lisinopril 10 mg daily.    6. Adenomatous polyp of colon, unspecified part of colon  Patient with history of colon polyps.  Referral for GI for colonoscopy has been placed since patient is due for colonoscopy.    7. Obesity (BMI 30-39.9)  Chronic, uncontrolled. "  BMI of 32.4.  Patient was counseled on diet and exercise.        Billing : Secondary to the complexity of this patient's illnesses and their interactions.  See assessment and plan above for the comprehensive evaluation and management of the patient's acute and chronic concerns.  As the patient's PCP, I am the continued focal point for all health care services for the patient's needs and ongoing subsequent medical care.  All problems listed were discussed during the office visit, medications were evaluated and complexities were discussed, as well as plan for the future.      Return in about 3 months (around 10/16/2025), or if symptoms worsen or fail to improve, for DM.    Please note that this dictation was created using voice recognition software. I have made every reasonable attempt to correct obvious errors, but I expect that there are errors of grammar and possibly content that I did not discover before finalizing the note.

## 2025-07-22 NOTE — Clinical Note
REFERRAL APPROVAL NOTICE         Sent on July 22, 2025                   Kristie Miguelito  [address] [place] [address]                   Dear Ms. Farley,    After a careful review of the medical information and benefit coverage, [facility] has processed your referral. See below for additional details.    If applicable, you must be actively enrolled with your insurance for coverage of the authorized service. If you have any questions regarding your coverage, please contact your insurance directly.    REFERRAL INFORMATION   Referral #:  [id number]  Referred-To Provider    Referred-By Provider:  Gastroenterology    Nell James M.D.   [facility]      [address]  [place] [address]  [phone] [address] [address]  [phone]    Referral Start Date:  07/16/2025  Referral End Date:   07/16/2026             SCHEDULING  If you do not already have an appointment, please call [phone] to make an appointment.     MORE INFORMATION  If you do not already have a [employer] account, sign up at: [email or web address]  You can access your medical information, make appointments, see lab results, billing information, and more.  If you have questions regarding this referral, please contact  the [facility] Referrals department at:             [phone]. Monday - Friday 8:00AM - 5:00PM.     Sincerely,    [facility]

## 2025-08-02 DIAGNOSIS — E11.9 TYPE 2 DIABETES MELLITUS WITHOUT COMPLICATION, WITHOUT LONG-TERM CURRENT USE OF INSULIN (HCC): ICD-10-CM

## 2025-08-21 DIAGNOSIS — I10 ESSENTIAL HYPERTENSION: ICD-10-CM

## 2025-08-21 RX ORDER — LISINOPRIL 10 MG/1
10 TABLET ORAL DAILY
Qty: 90 TABLET | Refills: 0 | Status: SHIPPED | OUTPATIENT
Start: 2025-08-21